# Patient Record
Sex: FEMALE | Race: WHITE | NOT HISPANIC OR LATINO | Employment: FULL TIME | URBAN - NONMETROPOLITAN AREA
[De-identification: names, ages, dates, MRNs, and addresses within clinical notes are randomized per-mention and may not be internally consistent; named-entity substitution may affect disease eponyms.]

---

## 2018-08-15 ENCOUNTER — OFFICE VISIT CONVERTED (OUTPATIENT)
Dept: FAMILY MEDICINE CLINIC | Age: 58
End: 2018-08-15
Attending: NURSE PRACTITIONER

## 2018-12-07 ENCOUNTER — OFFICE VISIT CONVERTED (OUTPATIENT)
Dept: FAMILY MEDICINE CLINIC | Age: 58
End: 2018-12-07
Attending: NURSE PRACTITIONER

## 2020-01-27 ENCOUNTER — OFFICE VISIT CONVERTED (OUTPATIENT)
Dept: FAMILY MEDICINE CLINIC | Age: 60
End: 2020-01-27
Attending: NURSE PRACTITIONER

## 2020-02-03 ENCOUNTER — OFFICE VISIT CONVERTED (OUTPATIENT)
Dept: FAMILY MEDICINE CLINIC | Age: 60
End: 2020-02-03
Attending: NURSE PRACTITIONER

## 2020-02-13 ENCOUNTER — HOSPITAL ENCOUNTER (OUTPATIENT)
Dept: OTHER | Facility: HOSPITAL | Age: 60
Discharge: HOME | End: 2020-02-13
Attending: NURSE PRACTITIONER

## 2020-02-13 LAB
ALBUMIN SERPL-MCNC: 3.9 G/DL (ref 3.5–5)
ALBUMIN/GLOB SERPL: 1.6 {RATIO} (ref 1.4–2.6)
ALP SERPL-CCNC: 129 U/L (ref 53–141)
ALT SERPL-CCNC: 14 U/L (ref 10–40)
ANION GAP SERPL CALC-SCNC: 16 MMOL/L (ref 8–19)
AST SERPL-CCNC: 12 U/L (ref 15–50)
BILIRUB SERPL-MCNC: 0.58 MG/DL (ref 0.2–1.3)
BUN SERPL-MCNC: 14 MG/DL (ref 5–25)
BUN/CREAT SERPL: 25 {RATIO} (ref 6–20)
CALCIUM SERPL-MCNC: 9 MG/DL (ref 8.7–10.4)
CHLORIDE SERPL-SCNC: 103 MMOL/L (ref 99–111)
CHOLEST SERPL-MCNC: 171 MG/DL (ref 107–200)
CHOLEST/HDLC SERPL: 3.2 {RATIO} (ref 3–6)
CONV CO2: 24 MMOL/L (ref 22–32)
CONV TOTAL PROTEIN: 6.3 G/DL (ref 6.3–8.2)
CREAT UR-MCNC: 0.57 MG/DL (ref 0.5–0.9)
ERYTHROCYTE [DISTWIDTH] IN BLOOD BY AUTOMATED COUNT: 13.4 % (ref 11.5–14.5)
GFR SERPLBLD BASED ON 1.73 SQ M-ARVRAT: >60 ML/MIN/{1.73_M2}
GLOBULIN UR ELPH-MCNC: 2.4 G/DL (ref 2–3.5)
GLUCOSE SERPL-MCNC: 94 MG/DL (ref 65–99)
HBA1C MFR BLD: 13.9 G/DL (ref 12–16)
HCT VFR BLD AUTO: 43.1 % (ref 37–47)
HDLC SERPL-MCNC: 53 MG/DL (ref 40–60)
LDLC SERPL CALC-MCNC: 107 MG/DL (ref 70–100)
MCH RBC QN AUTO: 29.4 PG (ref 27–31)
MCHC RBC AUTO-ENTMCNC: 32.3 G/DL (ref 33–37)
MCV RBC AUTO: 91.3 FL (ref 81–99)
OSMOLALITY SERPL CALC.SUM OF ELEC: 288 MOSM/KG (ref 273–304)
PLATELET # BLD AUTO: 196 10*3/UL (ref 130–400)
PMV BLD AUTO: 10.2 FL (ref 7.4–10.4)
POTASSIUM SERPL-SCNC: 4 MMOL/L (ref 3.5–5.3)
RBC # BLD AUTO: 4.72 10*6/UL (ref 4.2–5.4)
SODIUM SERPL-SCNC: 139 MMOL/L (ref 135–147)
TRIGL SERPL-MCNC: 53 MG/DL (ref 40–150)
VLDLC SERPL-MCNC: 11 MG/DL (ref 5–37)
WBC # BLD AUTO: 7.45 10*3/UL (ref 4.8–10.8)

## 2021-04-12 ENCOUNTER — HOSPITAL ENCOUNTER (OUTPATIENT)
Dept: OTHER | Facility: HOSPITAL | Age: 61
Discharge: HOME | End: 2021-04-12
Attending: NURSE PRACTITIONER

## 2021-04-12 ENCOUNTER — OFFICE VISIT CONVERTED (OUTPATIENT)
Dept: FAMILY MEDICINE CLINIC | Age: 61
End: 2021-04-12
Attending: NURSE PRACTITIONER

## 2021-04-12 LAB
ALBUMIN SERPL-MCNC: 3.9 G/DL (ref 3.5–5)
ALBUMIN/GLOB SERPL: 1.6 {RATIO} (ref 1.4–2.6)
ALP SERPL-CCNC: 125 U/L (ref 53–141)
ALT SERPL-CCNC: 10 U/L (ref 10–40)
ANION GAP SERPL CALC-SCNC: 16 MMOL/L (ref 8–19)
AST SERPL-CCNC: 11 U/L (ref 15–50)
BILIRUB SERPL-MCNC: 0.43 MG/DL (ref 0.2–1.3)
BUN SERPL-MCNC: 11 MG/DL (ref 5–25)
BUN/CREAT SERPL: 16 {RATIO} (ref 6–20)
CALCIUM SERPL-MCNC: 9 MG/DL (ref 8.7–10.4)
CHLORIDE SERPL-SCNC: 105 MMOL/L (ref 99–111)
CHOLEST SERPL-MCNC: 172 MG/DL (ref 107–200)
CHOLEST/HDLC SERPL: 3.5 {RATIO} (ref 3–6)
CONV CO2: 24 MMOL/L (ref 22–32)
CONV TOTAL PROTEIN: 6.3 G/DL (ref 6.3–8.2)
CREAT UR-MCNC: 0.69 MG/DL (ref 0.5–0.9)
GFR SERPLBLD BASED ON 1.73 SQ M-ARVRAT: >60 ML/MIN/{1.73_M2}
GLOBULIN UR ELPH-MCNC: 2.4 G/DL (ref 2–3.5)
GLUCOSE SERPL-MCNC: 101 MG/DL (ref 65–99)
HDLC SERPL-MCNC: 49 MG/DL (ref 40–60)
LDLC SERPL CALC-MCNC: 107 MG/DL (ref 70–100)
OSMOLALITY SERPL CALC.SUM OF ELEC: 292 MOSM/KG (ref 273–304)
POTASSIUM SERPL-SCNC: 4.1 MMOL/L (ref 3.5–5.3)
SODIUM SERPL-SCNC: 141 MMOL/L (ref 135–147)
TRIGL SERPL-MCNC: 79 MG/DL (ref 40–150)
VLDLC SERPL-MCNC: 16 MG/DL (ref 5–37)

## 2021-05-18 NOTE — PROGRESS NOTES
Daily Cartagena  1960     Office/Outpatient Visit    Visit Date: Mon, Apr 12, 2021 08:40 am    Provider: Clarissa Vasques N.P. (Assistant: Sonam Elena LPN)    Location: [facility]        Electronically signed by Clarissa Vasques N.P. on  04/12/2021 10:27:04 AM                             Subjective:        CC: Ms. Cartagena is a 60 year old White female.  This is a follow-up visit.  med refills, also hasn't been feeling well, been having issues with Mucus, had a sinus headache on left side.;         HPI:           Ms. Cartagena desires help with smoking cessation.  She reports smoking one-half pack per day.  She has smoked for.  She smokes the most at home and in the car.  no interest in quitting           Heartburn details; the discomfort is fairly diffuse and does not localize.  heartburn  on Omeprazole  Takes Daily Never had a scope in the past           Additionally, she presents with history of essential (primary) hypertension.  compliance with treatment has been good.  She is tolerating the medication well without side effects.  She did not bring her blood pressure diary, but says that pressures have been okay.  Taking Metoprolol, losartan and amlodipine          In regard to the anxiety disorder, unspecified, her anxiety disorder was originally diagnosed many years ago.  taking citalopram           Concerning dorsalgia, unspecified, reason for visit: Pain.  The discomfort is most prominent in the lumbar spine.  This is a chronic problem, with essentially constant pain.  Other details: off an on flares  takes OTC  no problems today.            Additionally, she presents with history of hyperlipidemia, unspecified.  current treatment includes Lipitor.  Compliance with treatment has been good.  She specifically denies associated symptoms, including muscle pain and weakness.            With regard to the insomnia, unspecified, remedies that she has already tried include OTC sleep aids and  benadryl.      ROS:     CONSTITUTIONAL:  Negative for chills, fatigue and fever.      E/N/T:  Positive for sore throat ( acute; from drainage ).      CARDIOVASCULAR:  Negative for chest pain and pedal edema.      RESPIRATORY:  Positive for recent cough and drainage in throat.   Negative for dyspnea.      MUSCULOSKELETAL:  Positive for joint stiffness (right ankle with intermittent swelling - but goes down the next day).   Negative for arthralgias or myalgias.      NEUROLOGICAL:  Negative for dizziness, fainting, headaches and paresthesias.      ENDOCRINE:  Negative for hair loss, polydipsia and polyphagia.      ALLERGIC/IMMUNOLOGIC:  Positive for seasonal allergies.      PSYCHIATRIC:  Negative for anxiety, depression and suicidal thoughts.          Past Medical History / Family History / Social History:         Last Reviewed on 2021 09:00 AM by Clarissa Vasques    Past Medical History:             GYNECOLOGICAL HISTORY:             PREVENTIVE HEALTH MAINTENANCE             COLORECTAL CANCER SCREENING: declines colorectal cancer screening, understands reason for testing     MAMMOGRAM: was last done past due declines, understands reason for testing has not been done since early 40s     PAP SMEAR: was last done unsure -years ago         Surgical History:         Laminectomy: lumbar region; Procedures: EGD ;; Treadmill stress test --NEG;;         Family History:         Positive for Hypertension.      Positive for Cancer (type not specified).      Positive for Type 2 Diabetes.          Social History:     Occupation: [employer]     Marital Status:      Children: 2 children         Tobacco/Alcohol/Supplements:     Last Reviewed on 2021 08:45 AM by Sonam Elena    Tobacco: Current Smoker: She currently smokes every day, 1-5 cigarettes per day; 1/2 pack per day.  Non-drinker     Caffeine:  She admits to consuming caffeine via coffee ( -decaf ).          Substance Abuse History:      Last Reviewed on 10/18/2016 01:35 PM by Clarissa Vasques    None         Mental Health History:     Last Reviewed on 10/18/2016 01:35 PM by Clarissa Vasques        Communicable Diseases (eg STDs):     Last Reviewed on 10/18/2016 01:35 PM by Clarissa Vasques    Reportable health conditions; NEGATIVE         Current Problems:     Last Reviewed on 4/12/2021 09:00 AM by Clarissa Vasques    Nicotine dependence, unspecified, uncomplicated    Heartburn    Essential (primary) hypertension    Anxiety disorder, unspecified    Dorsalgia, unspecified    Hyperlipidemia, unspecified    Encounter for screening for depression    Insomnia, unspecified    Acute upper respiratory infection, unspecified        Immunizations:     None        Allergies:     Last Reviewed on 4/12/2021 08:45 AM by Sonam Elena    Lisinopril: cough  (Adverse Reaction)        Current Medications:     Last Reviewed on 4/12/2021 08:45 AM by Sonam Elena    omeprazole 20 mg oral capsule,delayed release (enteric coated) [1 capsule daily]    aspirin 325 mg oral tablet [1 tab daily]    metoprolol tartrate 25 mg oral tablet [Take 1 tablet by mouth twice daily]    citalopram 40 mg oral tablet [Take 1 tablet by mouth once daily]    atorvastatin 40 mg oral tablet [TAKE 1 TABLET BY MOUTH AT BEDTIME]    losartan 50 mg oral tablet [Take 1 tablet by mouth twice daily]    amLODIPine 2.5 mg oral tablet [Take 1 tablet by mouth once daily]        Objective:        Vitals:         Current: 4/12/2021 8:47:19 AM    Ht:  5 ft, 2.5 in;  Wt: 202.6 lbs;  BMI: 36.5T: 97.9 F (temporal);  BP: 148/106 mm Hg (left arm, sitting);  P: 88 bpm (left arm (BP Cuff), sitting);  sCr: 0.57 mg/dL;  GFR: 116.45        Repeat:     8:48:55 AM  BP:   131/96mm Hg (left arm, sitting) 8:49:9 AM  P:   91bpm (left arm (BP Cuff), sitting)     Exams:     PHYSICAL EXAM:     GENERAL: vital signs recorded - well developed, well nourished, obese;  no apparent distress;     E/N/T: EARS: external auditory  canal normal and erythematous bilaterally;  bilateral TMs are normal;  OROPHARYNX: posterior pharynx shows PND;     RESPIRATORY: normal appearance and symmetric expansion of chest wall; normal respiratory rate and pattern with no distress; decreased breath sounds throughout; no adventitous sounds noted;     CARDIOVASCULAR: normal rate; rhythm is regular;  no edema;     LYMPHATIC: no enlargement of cervical or facial nodes; no supraclavicular nodes;     MUSCULOSKELETAL: normal gait; normal range of motion of all major muscle groups; no limb or joint pain with range of motion;     NEUROLOGIC: mental status: alert and oriented x 3;     PSYCHIATRIC: appropriate affect and demeanor; normal speech pattern; normal thought and perception;         Assessment:         F17.200   Nicotine dependence, unspecified, uncomplicated       R12   Heartburn       I10   Essential (primary) hypertension       F41.9   Anxiety disorder, unspecified       M54.9   Dorsalgia, unspecified       E78.5   Hyperlipidemia, unspecified       J06.9   Acute upper respiratory infection, unspecified       G47.00   Insomnia, unspecified       R60.0   Localized edema           ORDERS:         Meds Prescribed:       [Refilled] omeprazole 20 mg oral capsule,delayed release (enteric coated) [1 capsule daily], #90 (ninety) capsules, Refills: 1 (one)       [Refilled] losartan 50 mg oral tablet [Take 1 tablet by mouth twice daily], #180 (one hundred and eighty) tablets, Refills: 1 (one)       [Refilled] atorvastatin 40 mg oral tablet [TAKE 1 TABLET BY MOUTH AT BEDTIME], #90 (ninety) tablets, Refills: 1 (one)       [Refilled] metoprolol tartrate 25 mg oral tablet [Take 1 tablet by mouth twice daily], #180 (one hundred and eighty) tablets, Refills: 1 (one)       [Refilled] citalopram 40 mg oral tablet [Take 1 tablet by mouth once daily], #90 (ninety) tablets, Refills: 1 (one)       [Refilled] amLODIPine 2.5 mg oral tablet [Take 1 tablet by mouth once daily], #90  (ninety) tablets, Refills: 1 (one)       [New Rx] traZODone 50 mg oral tablet [take 1/2 -  1 tablet (50 mg) by oral route HS PRN Insomnia], #30 (thirty) tablets, Refills: 1 (one)         Lab Orders:       97050  [facility] Comp. Metabolic Panel  (Send-Out)            27891  [facility] Lipid Panel  (Send-Out)              Other Orders:       4004F  Pt scrnd tobacco use rcvd tobacco cessation talk  (In-House)                      Plan:         Nicotine dependence, unspecified, uncomplicatedcontinue to decrease amount during the day and set goal for cessation         RECOMMENDATIONS given include: Counseled on smoking cessation and advised of the benefits to patient's health if she were to stop smoking. Counseling for less than 3 minutes.            Orders:       4004F  Pt scrnd tobacco use rcvd tobacco cessation talk  (In-House)              HeartburnLifestyle risk factors  obesity, smoking, anxiety            Prescriptions:       [Refilled] omeprazole 20 mg oral capsule,delayed release (enteric coated) [1 capsule daily], #90 (ninety) capsules, Refills: 1 (one)         Essential (primary) hypertensioncontinue to monitor at home,  if elevated, RTO sooner, otherwise, weight loss, smoking cessation, decrease salt intake    LABORATORY:  Labs ordered to be performed today include Comprehensive metabolic panel and lipid panel.            Prescriptions:       [Refilled] losartan 50 mg oral tablet [Take 1 tablet by mouth twice daily], #180 (one hundred and eighty) tablets, Refills: 1 (one)       [Refilled] amLODIPine 2.5 mg oral tablet [Take 1 tablet by mouth once daily], #90 (ninety) tablets, Refills: 1 (one)       [Refilled] metoprolol tartrate 25 mg oral tablet [Take 1 tablet by mouth twice daily], #180 (one hundred and eighty) tablets, Refills: 1 (one)           Orders:       [id number]  [facility] - [facility] Comp. Metabolic Panel  (Send-Out)            [facility]  [facility]              Anxiety disorder,  unspecified          Prescriptions:       [Refilled] citalopram 40 mg oral tablet [Take 1 tablet by mouth once daily], #90 (ninety) tablets, Refills: 1 (one)         Dorsalgia, unspecifiedfollow up PRN         Hyperlipidemia, unspecified          Prescriptions:       [Refilled] atorvastatin 40 mg oral tablet [TAKE 1 TABLET BY MOUTH AT BEDTIME], #90 (ninety) tablets, Refills: 1 (one)         Acute upper respiratory infection, unspecifiedsounds like allergy related,  Recommended OTC claratin, zyrtec and flonase.  Smoking cessation and RTO if new or worsening symptoms            Insomnia, unspecifiedwill try trazodone.  Discussed next day drowsiness possible. Take on a day off   RTO if no improvement or if working, she will call for refills and ok to fill until October appt          Prescriptions:       [New Rx] traZODone 50 mg oral tablet [take 1/2 -  1 tablet (50 mg) by oral route HS PRN Insomnia], #30 (thirty) tablets, Refills: 1 (one)             Patient Recommendations:        For  Nicotine dependence, unspecified, uncomplicated:        Stop smoking.              Charge Capture:         Primary Diagnosis:     F17.200  Nicotine dependence, unspecified, uncomplicated           Orders:      70987  Office/outpatient visit; established patient, level 4  (In-House)            4004F  Pt scrnd tobacco use rcvd tobacco cessation talk  (In-House)              R12  Heartburn     I10  Essential (primary) hypertension     F41.9  Anxiety disorder, unspecified     M54.9  Dorsalgia, unspecified     E78.5  Hyperlipidemia, unspecified     J06.9  Acute upper respiratory infection, unspecified     G47.00  Insomnia, unspecified     R60.0  Localized edema

## 2021-05-18 NOTE — PROGRESS NOTES
Daily Cartagena  1960     Office/Outpatient Visit    Visit Date:  03:38 pm    Provider: Yahaira Sanchez N.P. (Assistant: Raya Johnson MA)    Location: [facility]        Electronically signed by Yahaira Sanchez N.P. on  2020 07:01:51 PM                             Subjective:        CC: Ms. Cartagena is a 59 year old White female.  elevated bp;         HPI: Daily is here with concerns about her blood pressure. She reports that she saw her dentist Dr Miguel and is supposed to be having some oral procedures and surgeries performed. Was told that her blood pressure was high and that it needs to be better controlled prior to any of these procedures. Reports readings 143/99, 141/94, 138/90. She reports that she works from 315 pm to 7 am. Takes her medication at different times daily. She does not sleep well.    ROS:     CONSTITUTIONAL:  Positive for fatigue.   Negative for fever.      E/N/T:  Positive for tooth pain.   Negative for diminished hearing.      CARDIOVASCULAR:  Negative for chest pain and palpitations.      RESPIRATORY:  Negative for dyspnea and frequent wheezing.      NEUROLOGICAL:  Negative for dizziness and headaches.      PSYCHIATRIC:  Negative for depression and suicidal thoughts.          Past Medical History / Family History / Social History:         Last Reviewed on 2020 04:18 PM by Yahaira Sanchez    Past Medical History:             GYNECOLOGICAL HISTORY:             PREVENTIVE HEALTH MAINTENANCE             COLORECTAL CANCER SCREENING: declines colorectal cancer screening, understands reason for testing     MAMMOGRAM: was last done past due declines, understands reason for testing has not been done since early 40s     PAP SMEAR: was last done unsure -years ago         Surgical History:         Laminectomy: lumbar region; Procedures: EGD ;; Treadmill stress test --NEG;;         Family History:         Positive for Hypertension.      Positive  for Cancer (type not specified).      Positive for Type 2 Diabetes.          Social History:     Occupation: [employer]     Marital Status:      Children: 2 children         Tobacco/Alcohol/Supplements:     Last Reviewed on 1/27/2020 03:42 PM by Raya Johnson    Tobacco: Current Smoker: She currently smokes every day, 1/2 pack per day.  Non-drinker     Caffeine:  She admits to consuming caffeine via coffee ( -decaf ).          Substance Abuse History:     Last Reviewed on 10/18/2016 01:35 PM by Clarissa Vasques    None         Mental Health History:     Last Reviewed on 10/18/2016 01:35 PM by Clarissa Vasques        Communicable Diseases (eg STDs):     Last Reviewed on 10/18/2016 01:35 PM by Clarissa Vasques    Reportable health conditions; NEGATIVE         Current Problems:     Last Reviewed on 8/15/2018 03:36 PM by Clarissa Vasques    Nicotine dependence, unspecified, uncomplicated    Cigarette smoking    GERD    Heartburn    Hiatal hernia    Essential (primary) hypertension    Palpitations (mild, neg cardiac w/u in 2014)    HTN    Mitral valve regurgitation    Anxiety    Anxiety disorder, unspecified    Psoriasis    Back pain    Dorsalgia, unspecified    Hyperlipidemia    Other hyperlipidemia    Encounter for screening for depression        Immunizations:     None        Allergies:     Last Reviewed on 1/27/2020 03:42 PM by Raya Johnson    No Known Allergies.    Lisinopril: cough  (Adverse Reaction)        Current Medications:     Last Reviewed on 1/27/2020 03:42 PM by Raya Johnson    Omeprazole 20mg Capsules, Extended Release [1 capsule daily]    Aspirin (ASA) 325mg Tablet [1 tab daily]    Metoprolol Tartrate 25mg Tablet [Take 1 tablet(s) by mouth twice daily]    Losartan 25mg Tablet [Take 1 tablet(s) by mouth daily]    ATORVASTATIN 40MG   TAB [TAKE 1 TABLET BY MOUTH AT BEDTIME]    CITALOPRAM 40MG     TAB [TAKE 1 TABLET BY MOUTH ONCE DAILY]        Objective:         Vitals:         Historical:     12/7/2018  BP:   158/91 mm Hg ( (left arm, , sitting, );) 8/15/2018  BP:   138/84 mm Hg ( (right arm, , sitting, );) 9/22/2017  BP:   132/84 mm Hg ( (left arm, , sitting, );)     Current: 1/27/2020 3:44:16 PM    Ht:  5 ft, 2.5 in;  Wt: 193.2 lbs;  BMI: 34.8T: 98.7 F (oral);  BP: 131/99 mm Hg (left arm, sitting);  P: 91 bpm (left arm (BP Cuff), sitting);  sCr: 0.85 mg/dL;  GFR: 77.46        Exams:     PHYSICAL EXAM:     GENERAL: well developed, well nourished;  no apparent distress;     RESPIRATORY: normal respiratory rate and pattern with no distress; normal breath sounds with no rales, rhonchi, wheezes or rubs;     CARDIOVASCULAR: normal rate; rhythm is regular;     MUSCULOSKELETAL: normal gait;     NEUROLOGIC: mental status: alert and oriented x 3; GROSSLY INTACT     PSYCHIATRIC: appropriate affect and demeanor;         Assessment:         I10   Essential (primary) hypertension       Z13.31   Encounter for screening for depression       F17.200   Nicotine dependence, unspecified, uncomplicated           ORDERS:         Meds Prescribed:       [New Rx] losartan 50 mg oral tablet [take 1 tablet (50 mg) by oral route once daily], #30 (thirty) tablets, Refills: 0 (zero)         Other Orders:         Depression screen positive and follow up plan documented  (In-House)            4004F  Pt scrnd tobacco use rcvd tobacco cessation talk  (In-House)                      Plan:         Essential (primary) hypertensionWill continue Metoprolol at current dose. Advised to take regularly. Will increase Losartan to 50 mg daily. To return next Monday for free BP check. She is due for follow up with PCP. Reports that she will call to schedule.        RECOMMENDATIONS given include: keep blood pressure log as directed and eat a low salt diet.            Prescriptions:       [New Rx] losartan 50 mg oral tablet [take 1 tablet (50 mg) by oral route once daily], #30 (thirty) tablets, Refills: 0 (zero)          Encounter for screening for depression    MIPS PHQ-9 Depression Screening: Completed form scanned and in chart; Total Score 8 Positive Depression Screen: Suicide Risk Assessment completed--denies suicidal/homicidal ideation; Stable on medications. No suicidal ideation.            Orders:         Depression screen positive and follow up plan documented  (In-House)              Nicotine dependence, unspecified, uncomplicated        RECOMMENDATIONS given include: Counseled on smoking cessation and advised of the benefits to patient's health if she were to stop smoking. Counseling for less than 3 minutes.            Orders:       4004F  Pt scrnd tobacco use rcvd tobacco cessation talk  (In-House)                  Patient Recommendations:        For  Essential (primary) hypertension:    Keep a daily blood pressure log and report elevated blood pressure to provider as directed.          For  Nicotine dependence, unspecified, uncomplicated:        Stop smoking.              Charge Capture:         Primary Diagnosis:     I10  Essential (primary) hypertension           Orders:      38289  Office/outpatient visit; established patient, level 3  (In-House)              Z13.31  Encounter for screening for depression           Orders:        Depression screen positive and follow up plan documented  (In-House)              F17.200  Nicotine dependence, unspecified, uncomplicated           Orders:      4004F  Pt scrnd tobacco use rcvd tobacco cessation talk  (In-House)

## 2021-05-18 NOTE — PROGRESS NOTES
Daily Cartagena 1960     Office/Outpatient Visit    Visit Date: Fri, Dec 7, 2018 10:31 am    Provider: Clarissa Vasques N.P. (Assistant: Gregorio Silver)    Location: [facility]        Electronically signed by Clarissa Vasques N.P. on  2018 04:53:49 PM                             SUBJECTIVE:        CC:     Ms. Cartagena is a 58 year old White female.  This is a follow-up visit.  (not taking trazodone) needs refills on bp meds;         HPI:         Patient to be evaluated for hTN.  She did not bring her blood pressure diary, but says that pressures have been okay.  She is tolerating the medication well without side effects.  Compliance with treatment has been good.  has not taken her BP medication today.  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  currently taking citalopram for her anxiety doing ok  no new problems noted - other than her continuing problem with sleep - which she does work shift work and knows that this is much of the reason     ROS:     CONSTITUTIONAL:  Negative for chills, fatigue, fever, and weight change.      CARDIOVASCULAR:  Negative for chest pain, orthopnea, paroxysmal nocturnal dyspnea and pedal edema.      RESPIRATORY:  Positive for recent cough ( typically dry; with the lisinopril ).   Negative for dyspnea.      GASTROINTESTINAL:  Negative for abdominal pain, constipation, diarrhea, nausea and vomiting.      NEUROLOGICAL:  Negative for dizziness, headaches, paresthesias, and weakness.      PSYCHIATRIC:  Positive for anxiety ( (controlled on medication) ), feelings of stress and insomnia.   Negative for crying spells, depression, anhedonia, mood swings, personality change, difficulty concentrating or recreational drug use.          PMH/FMH/SH:     Last Reviewed on 2017 10:10 AM by Clarissa Vasques    Past Medical History:             GYNECOLOGICAL HISTORY:              PREVENTIVE HEALTH MAINTENANCE             COLONOSCOPY: has never been done     MAMMOGRAM: was last done past due     PAP SMEAR: was last done unsure -years ago         Surgical History:         Laminectomy: lumbar region; Procedures: EGD 1999;; Treadmill stress test 2014--NEG;;         Family History:         Positive for Hypertension.      Positive for Cancer (type not specified).      Positive for Type 2 Diabetes.          Social History:     Occupation: [employer]     Marital Status:      Children: 2 children         Tobacco/Alcohol/Supplements:     Last Reviewed on 12/07/2018 10:33 AM by Gregorio Silver    Tobacco: Current Smoker: She currently smokes every day, 1/2 pack per day.  Non-drinker     Caffeine:  She admits to consuming caffeine via coffee ( -decaf ).          Substance Abuse History:     Last Reviewed on 10/18/2016 01:35 PM by Clarissa Vasques    None         Mental Health History:     Last Reviewed on 10/18/2016 01:35 PM by Clarissa Vasques        Communicable Diseases (eg STDs):     Last Reviewed on 10/18/2016 01:35 PM by Clarissa Vasques    Reportable health conditions; NEGATIVE             Current Problems:     Last Reviewed on 8/15/2018 03:36 PM by Clarissa Vasques    Hyperlipidemia     Back pain     Psoriasis     Anxiety     Mitral valve regurgitation     Palpitations (mild, neg cardiac w/u in 2014)     HTN     Hiatal hernia     GERD     Cigarette smoking     Insomnia         Immunizations:     None        Allergies:     Last Reviewed on 12/07/2018 10:33 AM by Gregorio Silver      No Known Drug Allergies.     Lisinopril: cough (Adverse Reaction)        Current Medications:     Last Reviewed on 12/07/2018 10:34 AM by Gregorio Silver    Metoprolol Tartrate 25mg Tablet Take 1 tablet(s) by mouth twice daily     Aspirin (ASA) 325mg Tablet 1 tab daily     Omeprazole 20mg Capsules, Extended Release 1 capsule daily     Atorvastatin Calcium 40mg Tablet 1 tab hs     Citalopram  Hydrobromide 40mg Tablet 1 tab daily         OBJECTIVE:        Vitals:         Current: 12/7/2018 10:35:26 AM    Ht:  5 ft, 2.5 in;  Wt: 194.4 lbs;  BMI: 35.0    T: 98.9 F (oral);  BP: 158/91 mm Hg (left arm, sitting);  P: 57 bpm (left arm (BP Cuff), sitting);  sCr: 0.85 mg/dL;  GFR: 78.59        Exams:     PHYSICAL EXAM:     GENERAL: vital signs recorded - well developed, well nourished, obese;  no apparent distress;     NECK: range of motion is normal; thyroid is non-palpable;     RESPIRATORY: normal respiratory rate and pattern with no distress; normal breath sounds with no rales, rhonchi, wheezes or rubs;     CARDIOVASCULAR: normal rate; rhythm is regular;  no systolic murmur; no edema;     LYMPHATIC: no enlargement of cervical or facial nodes; no supraclavicular nodes;     NEUROLOGICAL:  cranial nerves, motor and sensory function, reflexes, gait and coordination are all intact;     PSYCHIATRIC:  appropriate affect and demeanor; normal speech pattern; grossly normal memory;         ASSESSMENT:           401.1   I10  HTN              DDx:     300.02   F41.9  Anxiety              DDx:     780.52   G47.00  Insomnia              DDx:     272.4   E78.4  Hyperlipidemia              DDx:         ORDERS:         Meds Prescribed:       Hydroxyzine HCl 25mg Tablet 1-2 tablets qhs  #45 (Forty Five) tablet(s) Refills: 1       Refill of: Metoprolol Tartrate 25mg Tablet Take 1 tablet(s) by mouth twice daily  #180 (One [place] and Eighty) tablet(s) Refills: 3       Losartan 25mg Tablet Take 1 tablet(s) by mouth daily  #90 (Ninety) tablet(s) Refills: 3       Refill of: Citalopram Hydrobromide 40mg Tablet 1 tab daily  #90 (Ninety) tablet(s) Refills: 3       Refill of: Atorvastatin Calcium 40mg Tablet 1 tab hs  #90 (Ninety) tablet(s) Refills: 3                 PLAN:          HTN           Prescriptions:       Refill of: Metoprolol Tartrate 25mg Tablet Take 1 tablet(s) by mouth twice daily  #180 (One [place] and Eighty) tablet(s)  Refills: 3       Losartan 25mg Tablet Take 1 tablet(s) by mouth daily  #90 (Ninety) tablet(s) Refills: 3           Patient Education Handouts:       DASH Diet           Anxiety           Prescriptions:       Refill of: Citalopram Hydrobromide 40mg Tablet 1 tab daily  #90 (Ninety) tablet(s) Refills: 3          Insomnia           Prescriptions:       Hydroxyzine HCl 25mg Tablet 1-2 tablets qhs  #45 (Forty Five) tablet(s) Refills: 1          Hyperlipidemia           Prescriptions:       Refill of: Atorvastatin Calcium 40mg Tablet 1 tab hs  #90 (Ninety) tablet(s) Refills: 3             Other Orders:       40069  Office/outpatient visit; established patient, level 4  (In-House)           CHARGE CAPTURE:           Primary Diagnosis:     401.1 HTN            I10    Essential (primary) hypertension    300.02 Anxiety            F41.9    Anxiety disorder, unspecified    780.52 Insomnia            G47.00    Insomnia, unspecified    272.4 Hyperlipidemia            E78.4    Other hyperlipidemia        Other Orders:           53757   Office/outpatient visit; established patient, level 4  (In-House)

## 2021-05-18 NOTE — PROGRESS NOTES
Daily Cartagena 1960     Office/Outpatient Visit    Visit Date: Wed, Aug 15, 2018 03:12 pm    Provider: Clarissa Vasques N.P. (Assistant: Sarah Spurling, MA)    Location: [facility]        Electronically signed by Clarissa Vasques N.P. on  08/17/2018 08:44:37 AM                             SUBJECTIVE:        CC:     Ms. Cartagena is a 57 year old White female.  This is a follow-up visit.  med refill and pulled muscle;         HPI:         Ms. Cartagena presents with sprains and strains of other and unspecified parts of back, unspecified.  The location is primarily in the lower, left lumbar spine.  It does not radiate.  She characterizes it as moderate in intensity.  This is an acute episode with no prior history of back pain.  The event which precipitated this pain was hanging drywall yesterday.  This occurred at home.      Regarding GERD,  taking Omeprazole daily  - working well to control symptoms.  Takes OTC - no concerns at this time         Dx with hTN; this was first diagnosed several years ago.  Her current cardiac medication regimen includes a beta-blocker ( metoprolol ).  Ms. Cartagena does not check her blood pressure other than at her clinic appointments.  She is tolerating the medication well without side effects.  Compliance with treatment has been good.          With regard to the anxiety, her anxiety disorder was originally diagnosed many years ago.  Her symptom complex includes insomnia, palpitations, and tachycardia.  True panic attacks apparently do not occur.  The frequency symptoms is several times per day.  There are no apparent triggers that increase the anxiety.  Current treatment includes Celexa.  feels as if her medication is not as effective as in the past - still having some racing thoughts     ROS:     CONSTITUTIONAL:  Negative for chills, fatigue and fever.      CARDIOVASCULAR:  Negative for chest pain and pedal edema.      RESPIRATORY:  Negative for recent cough and dyspnea.       GASTROINTESTINAL:  Positive for acid reflux symptoms ( controlled on medication ).   Negative for abdominal pain, constipation, diarrhea, heartburn, nausea or vomiting.      MUSCULOSKELETAL:  Positive for back pain and myalgias.      NEUROLOGICAL:  Negative for paresthesias and weakness.      PSYCHIATRIC:  Positive for anxiety, feelings of stress, difficulty concentrating and insomnia.   Negative for crying spells, depression, sadness or suicidal thoughts.          PMH/FMH/SH:     Last Reviewed on 2017 10:10 AM by Clarissa Vasques    Past Medical History:             GYNECOLOGICAL HISTORY:             PREVENTIVE HEALTH MAINTENANCE             COLONOSCOPY: has never been done     MAMMOGRAM: was last done past due     PAP SMEAR: was last done unsure -years ago         Surgical History:         Laminectomy: lumbar region; Procedures: EGD ;; Treadmill stress test --NEG;;         Family History:         Positive for Hypertension.      Positive for Cancer (type not specified).      Positive for Type 2 Diabetes.          Social History:     Occupation: [employer]     Marital Status:      Children: 2 children         Tobacco/Alcohol/Supplements:     Last Reviewed on 8/15/2018 03:18 PM by Spurling, Sarah C    Tobacco: Current Smoker: She currently smokes every day, 1/2 pack per day.  Non-drinker     Caffeine:  She admits to consuming caffeine via coffee ( -decaf ).          Substance Abuse History:     Last Reviewed on 10/18/2016 01:35 PM by Clarissa Vasques    None         Mental Health History:     Last Reviewed on 10/18/2016 01:35 PM by Clarissa Vasques        Communicable Diseases (eg STDs):     Last Reviewed on 10/18/2016 01:35 PM by Clarissa Vasques    Reportable health conditions; NEGATIVE             Current Problems:     Last Reviewed on 8/15/2018 03:36 PM by Clarissa Vasques    Back pain     Psoriasis     Anxiety     Mitral valve regurgitation     Palpitations (mild, neg  cardiac w/u in 2014)     HTN     Hiatal hernia     GERD     Cigarette smoking     Insomnia     Sprains and strains of other and unspecified parts of back, unspecified         Immunizations:     None        Allergies:     Last Reviewed on 8/15/2018 03:17 PM by Spurling, Sarah C      No Known Drug Allergies.         Current Medications:     Last Reviewed on 8/15/2018 03:18 PM by Spurling, Sarah C    Citalopram Hydrobromide 20mg Tablet Take 1 tablet(s) by mouth daily     Metoprolol Tartrate 25mg Tablet Take 1 tablet(s) by mouth twice daily     Aspirin (ASA) 325mg Tablet 1 tab daily     Omeprazole 20mg Capsules, Extended Release 1 capsule daily         OBJECTIVE:        Vitals:         Historical:     09/22/2017  Wt:   183.8lbs        Current: 8/15/2018 3:20:56 PM    Ht:  5 ft, 2.5 in;  Wt: 194.2 lbs;  BMI: 35.0    T: 98.2 F (oral);  BP: 146/91 mm Hg (right arm, sitting);  P: 65 bpm (right arm (BP Cuff), sitting);  sCr: 0.72 mg/dL;  GFR: 93.84        Repeat:     3:44:19 PM     BP:   138/84mm Hg (right arm, sitting)         Exams:     PHYSICAL EXAM:     GENERAL: vital signs recorded - well developed, well nourished;  no apparent distress;     EYES: extraocular movements intact; PERRL;     E/N/T: EARS: external auditory canal normal;  bilateral TMs are normal;  NOSE:  normal nasal mucosa, septum, turbinates, and sinuses; OROPHARYNX:  normal mucosa, dentition, gingiva, and posterior pharynx;     NECK: range of motion is normal;     RESPIRATORY: normal appearance and symmetric expansion of chest wall; normal respiratory rate and pattern with no distress; normal breath sounds with no rales, rhonchi, wheezes or rubs;     CARDIOVASCULAR: normal rate; rhythm is regular;  no edema;     GASTROINTESTINAL: nontender; normal bowel sounds; no organomegaly;     LYMPHATIC: no enlargement of cervical or facial nodes; no supraclavicular nodes;     MUSCULOSKELETAL: normal gait; normal range of motion of all major muscle groups; no limb or  joint pain with range of motion;     NEUROLOGIC: mental status: alert and oriented x 3;     PSYCHIATRIC: appropriate affect and demeanor; normal speech pattern; normal thought and perception;         ASSESSMENT           847.9   S23.9XXA  Sprains and strains of other and unspecified parts of back, unspecified              DDx:     530.81   R12  GERD              DDx:     401.1   I10  HTN              DDx:     300.02   F41.9  Anxiety              DDx:     307.41   G47.00  Insomnia              DDx:     305.1   F17.200  Cigarette smoking              DDx:     V05.3   Z23  Vaccination against hepatitis A              DDx:         ORDERS:         Meds Prescribed:       Methocarbamol 500mg Tablets 1/2 - 1 tab daily PRN  #20 (Twenty) tablet(s) Refills: 0       Refill of: Citalopram Hydrobromide 40mg Tablet 1 tab daily  #90 (Ninety) tablet(s) Refills: 3       Refill of: Metoprolol Tartrate 25mg Tablet Take 1 tablet(s) by mouth twice daily  #180 (One [place] and Eighty) tablet(s) Refills: 3       Trazodone HCl 50mg Tablet 1/2 - 1 @HS PRN for sleep  #30 (Thirty) tablet(s) Refills: 1         Lab Orders:       FUTURE  Future order to be done at patients convenience  (Send-Out)         96760  BMP [facility] Basic Metabolic Panel  (Send-Out)         89095  LPDP [facility] Lipid Panel  (Send-Out)         32620  TSH - [facility] TSH  (Send-Out)           Procedures Ordered:       63396  HepA vaccine adult dose for intramuscular use  (In-House)           Other Orders:       4004F  Pt scrnd tobacco use rcvd tobacco cessation talk  (In-House)                   PLAN:          Sprains and strains of other and unspecified parts of back, unspecified recommend heat application- biofreeze OTC may consider PT in future if no resolution - rest over the next 2-3 weeks           Prescriptions:       Methocarbamol 500mg Tablets 1/2 - 1 tab daily PRN  #20 (Twenty) tablet(s) Refills: 0          GERD continue current medication  consider try tapering in future to  avoid long term chronic use          HTN         FOLLOW-UP TESTING #1: FOLLOW-UP LABORATORY:  Labs to be scheduled in the future include BMP, lipid panel, and TSH.            Prescriptions:       Refill of: Metoprolol Tartrate 25mg Tablet Take 1 tablet(s) by mouth twice daily  #180 (One [place] and Eighty) tablet(s) Refills: 3           Orders:       FUTURE  Future order to be done at patients convenience  (Send-Out)         28008  BMP [facility] Basic Metabolic Panel  (Send-Out)         39086  LPDP [facility] Lipid Panel  (Send-Out)         17238  TSH [facility] TSH  (Send-Out)            Anxiety increased medication dose today           Prescriptions:       Refill of: Citalopram Hydrobromide 40mg Tablet 1 tab daily  #90 (Ninety) tablet(s) Refills: 3          Insomnia do not start trazodone until muscle relaxers complete           Prescriptions:       Trazodone HCl 50mg Tablet 1/2 - 1 @HS PRN for sleep  #30 (Thirty) tablet(s) Refills: 1          Cigarette smoking         RECOMMENDATIONS given include: Counseled on smoking cessation and advised of the benefits to patient's health if she were to stop smoking. Counseling for less than 3 minutes.            Orders:       4004F  Pt scrnd tobacco use rcvd tobacco cessation talk  (In-House)            Vaccination against hepatitis A         IMMUNIZATIONS given today: Hepatitis A.            Orders:       12929  HepA vaccine adult dose for intramuscular use  (In-House)               Patient Recommendations:        For  HTN:             The following laboratory testing has been ordered: metabolic panel, basic lipid panel TSH         For  Cigarette smoking:         Stop smoking.              CHARGE CAPTURE           **Please note: ICD descriptions below are intended for billing purposes only and may not represent clinical diagnoses**        Primary Diagnosis:         847.9 Sprains and strains of other and unspecified parts of back, unspecified            S23.9XXA    Sprain of unspecified  parts of thorax, initial encounter              Orders:          19367   Office/outpatient visit; established patient, level 4  (In-House)           530.81 GERD            R12    Heartburn    401.1 HTN            I10    Essential (primary) hypertension    300.02 Anxiety            F41.9    Anxiety disorder, unspecified    307.41 Insomnia            G47.00    Insomnia, unspecified    305.1 Cigarette smoking            F17.200    Nicotine dependence, unspecified, uncomplicated              Orders:          4004F   Pt scrnd tobacco use rcvd tobacco cessation talk  (In-House)           V05.3 Vaccination against hepatitis A            Z23    Encounter for immunization              Orders:          47018   HepA vaccine adult dose for intramuscular use  (In-House)               ADDENDUMS:      ____________________________________    Addendum: 08/21/2018 03:59 PM - Spurling, Sarah C        Patient was not given this vaccine, called and spoke with patient on 8-21-18 and she told me that she did not want the Hep A vaccine. Please remove order from note, thanks. \SCS

## 2021-05-18 NOTE — PROGRESS NOTES
Daily Cartagena  1960     Office/Outpatient Visit    Visit Date: Mon, Feb 3, 2020 10:45 am    Provider: Clarissa Vasques N.P. (Assistant: Leticia Nieves MA)    Location: [facility]        Electronically signed by Clarissa Vasques N.P. on  2020 11:24:47 PM                             Subjective:        CC: NOT TAKING LOSARTAN 25MGMs. Osiel is a 59 year old White female.  This is a follow-up visit.  check up;         HPI:           Ms. Cartagena presents with essential (primary) hypertension.  This was first diagnosed several years ago.  Current nonpharmacologic treatment includes low sodium diet and avoiding caffeine.  Her current cardiac medication regimen includes a beta-blocker and an angiotensin receptor blocker.  Compliance with treatment has been good.  She is tolerating the medication well without side effects.  Review of her blood pressure log reveals systolics in the 150s and diastolics in the low 100s.            Heartburn details; the discomfort is fairly diffuse and does not localize.  continues to take omeprazole - working well for her  would like refills           Concerning hyperlipidemia, unspecified, compliance with treatment has been good.  She specifically denies associated symptoms, including muscle pain and weakness.            Complaint of anxiety disorder, unspecified..  taking citralpram - doing well  need refill     ROS:     CONSTITUTIONAL:  Negative for chills, fatigue and fever.      CARDIOVASCULAR:  Negative for chest pain and pedal edema.      RESPIRATORY:  Negative for recent cough and dyspnea.      NEUROLOGICAL:  Positive for headaches ( occasional ).   Negative for dizziness, fainting or paresthesias.      PSYCHIATRIC:  Positive for feelings of stress.          Past Medical History / Family History / Social History:         Last Reviewed on 2020 11:48 AM by Clarissa Vasques    Past Medical History:             GYNECOLOGICAL HISTORY:              PREVENTIVE HEALTH MAINTENANCE             COLORECTAL CANCER SCREENING: declines colorectal cancer screening, understands reason for testing     MAMMOGRAM: was last done past due declines, understands reason for testing has not been done since early 40s     PAP SMEAR: was last done unsure -years ago         Surgical History:         Laminectomy: lumbar region; Procedures: EGD 1999;; Treadmill stress test 2014--NEG;;         Family History:         Positive for Hypertension.      Positive for Cancer (type not specified).      Positive for Type 2 Diabetes.          Social History:     Occupation: [employer]     Marital Status:      Children: 2 children         Tobacco/Alcohol/Supplements:     Last Reviewed on 1/27/2020 03:42 PM by Raya Johnson    Tobacco: Current Smoker: She currently smokes every day, 1/2 pack per day.  Non-drinker     Caffeine:  She admits to consuming caffeine via coffee ( -decaf ).          Substance Abuse History:     Last Reviewed on 10/18/2016 01:35 PM by Clarissa Vasques    None         Mental Health History:     Last Reviewed on 10/18/2016 01:35 PM by Clarissa Vasques        Communicable Diseases (eg STDs):     Last Reviewed on 10/18/2016 01:35 PM by Clarissa Vasques    Reportable health conditions; NEGATIVE         Current Problems:     Last Reviewed on 2/03/2020 11:48 AM by Clarissa Vasques    Nicotine dependence, unspecified, uncomplicated    Heartburn    Hiatal hernia    Essential (primary) hypertension    Palpitations (mild, neg cardiac w/u in 2014)    Mitral valve regurgitation    Anxiety disorder, unspecified    Psoriasis    Back pain    Dorsalgia, unspecified    Other hyperlipidemia    Hyperlipidemia, unspecified    Hyperlipidemia    Encounter for screening for depression        Immunizations:     None        Allergies:     Last Reviewed on 2/03/2020 10:53 AM by Leticia Nieves    Lisinopril: cough  (Adverse Reaction)        Current Medications:     Last  Reviewed on 1/27/2020 03:42 PM by Raya Johnson    Omeprazole 20 mg oral capsule,delayed release (enteric coated) [1 capsule daily]    aspirin 325 mg oral tablet [1 tab daily]    Metoprolol Tartrate 25mg Tablet [Take 1 tablet(s) by mouth twice daily]    ATORVASTATIN 40MG   TAB  [TAKE 1 TABLET BY MOUTH AT BEDTIME]    CITALOPRAM 40MG     TAB  [TAKE 1 TABLET BY MOUTH ONCE DAILY]    losartan 50 mg oral tablet [take 1 tablet (50 mg) by oral route once daily]    amLODIPine 2.5 mg oral tablet [take 1 tablet (2.5 mg) by oral route once daily]        Objective:        Vitals:         Current: 2/3/2020 10:52:27 AM    Ht:  5 ft, 2.5 in;  Wt: 195.4 lbs;  BMI: 35.2T: 98.5 F (oral);  BP: 162/104 mm Hg (left arm, sitting);  P: 79 bpm (left arm (BP Cuff), sitting);  sCr: 0.85 mg/dL;  GFR: 77.83        Repeat:     10:53:7 AM  BP:   145/100mm Hg (right arm, sitting, HR: 74)     Exams:     PHYSICAL EXAM:     GENERAL: vital signs recorded - well developed, well nourished;  no apparent distress;     NECK: range of motion is normal;     RESPIRATORY: normal appearance and symmetric expansion of chest wall; normal respiratory rate and pattern with no distress; normal breath sounds with no rales, rhonchi, wheezes or rubs;     CARDIOVASCULAR: normal rate; rhythm is regular;  no edema;     MUSCULOSKELETAL: normal gait; normal range of motion of all major muscle groups; no limb or joint pain with range of motion;     NEUROLOGIC: mental status: alert and oriented x 3;     PSYCHIATRIC: appropriate affect and demeanor; normal speech pattern; normal thought and perception;         Assessment:         I10   Essential (primary) hypertension       R12   Heartburn       E78.5   Hyperlipidemia, unspecified       F41.9   Anxiety disorder, unspecified           ORDERS:         Meds Prescribed:       [Refilled] losartan 50 mg oral tablet [take 1 tablet (50 mg) by oral route twice daily], #60 (sixty) tablets, Refills: 1 (one)       [New Rx]  amLODIPine 2.5 mg oral tablet [take 1 tablet (2.5 mg) by oral route once daily], #30 (thirty) tablets, Refills: 0 (zero)       [Refilled] omeprazole 20 mg oral capsule,delayed release (enteric coated) [1 capsule daily], #30 (thirty) capsules, Refills: 6 (six)       [Refilled] atorvastatin 40 mg oral tablet [TAKE 1 TABLET BY MOUTH AT BEDTIME], #30 (thirty) tablets, Refills: 3 (three)       [Refilled] citalopram 40 mg oral tablet [TAKE 1 TABLET BY MOUTH ONCE DAILY], #30 (thirty) tablets, Refills: 3 (three)                 Plan:         Essential (primary) hypertension          Prescriptions:       [Refilled] losartan 50 mg oral tablet [take 1 tablet (50 mg) by oral route twice daily], #60 (sixty) tablets, Refills: 1 (one)       [New Rx] amLODIPine 2.5 mg oral tablet [take 1 tablet (2.5 mg) by oral route once daily], #30 (thirty) tablets, Refills: 0 (zero)         Heartburn          Prescriptions:       [Refilled] omeprazole 20 mg oral capsule,delayed release (enteric coated) [1 capsule daily], #30 (thirty) capsules, Refills: 6 (six)         Hyperlipidemia, unspecified          Prescriptions:       [Refilled] atorvastatin 40 mg oral tablet [TAKE 1 TABLET BY MOUTH AT BEDTIME], #30 (thirty) tablets, Refills: 3 (three)         Anxiety disorder, unspecified          Prescriptions:       [Refilled] citalopram 40 mg oral tablet [TAKE 1 TABLET BY MOUTH ONCE DAILY], #30 (thirty) tablets, Refills: 3 (three)             Charge Capture:         Primary Diagnosis:     I10  Essential (primary) hypertension           Orders:      26940  Office/outpatient visit; established patient, level 4  (In-House)              R12  Heartburn     E78.5  Hyperlipidemia, unspecified     F41.9  Anxiety disorder, unspecified

## 2021-06-07 ENCOUNTER — OFFICE VISIT (OUTPATIENT)
Dept: FAMILY MEDICINE CLINIC | Age: 61
End: 2021-06-07

## 2021-06-07 VITALS
BODY MASS INDEX: 35.65 KG/M2 | DIASTOLIC BLOOD PRESSURE: 98 MMHG | TEMPERATURE: 99.8 F | SYSTOLIC BLOOD PRESSURE: 148 MMHG | WEIGHT: 201.2 LBS | HEIGHT: 63 IN | HEART RATE: 83 BPM

## 2021-06-07 DIAGNOSIS — M79.10 TENDON PAIN: ICD-10-CM

## 2021-06-07 DIAGNOSIS — I10 ESSENTIAL (PRIMARY) HYPERTENSION: ICD-10-CM

## 2021-06-07 DIAGNOSIS — R60.0 LOCALIZED EDEMA: Primary | ICD-10-CM

## 2021-06-07 PROCEDURE — 99214 OFFICE O/P EST MOD 30 MIN: CPT | Performed by: NURSE PRACTITIONER

## 2021-06-07 RX ORDER — OMEPRAZOLE 20 MG/1
20 CAPSULE, DELAYED RELEASE ORAL DAILY
COMMUNITY
Start: 2021-04-12 | End: 2023-02-13 | Stop reason: SDUPTHER

## 2021-06-07 RX ORDER — DICLOFENAC SODIUM 75 MG/1
75 TABLET, DELAYED RELEASE ORAL 2 TIMES DAILY PRN
Qty: 60 TABLET | Refills: 1 | Status: SHIPPED | OUTPATIENT
Start: 2021-06-07 | End: 2021-08-06

## 2021-06-07 RX ORDER — ATORVASTATIN CALCIUM 40 MG/1
40 TABLET, FILM COATED ORAL
COMMUNITY
Start: 2021-04-12 | End: 2022-07-25 | Stop reason: SDUPTHER

## 2021-06-07 RX ORDER — CITALOPRAM 40 MG/1
40 TABLET ORAL DAILY
COMMUNITY
Start: 2021-04-12 | End: 2023-02-22 | Stop reason: SDUPTHER

## 2021-06-07 RX ORDER — AMLODIPINE BESYLATE 2.5 MG/1
2.5 TABLET ORAL DAILY
COMMUNITY
Start: 2021-04-12 | End: 2022-05-31 | Stop reason: DRUGHIGH

## 2021-06-07 RX ORDER — TRAZODONE HYDROCHLORIDE 50 MG/1
50 TABLET ORAL NIGHTLY PRN
COMMUNITY
Start: 2021-05-09 | End: 2023-02-13 | Stop reason: SDUPTHER

## 2021-06-07 RX ORDER — LOSARTAN POTASSIUM 50 MG/1
50 TABLET ORAL 2 TIMES DAILY
COMMUNITY
Start: 2021-04-12 | End: 2023-02-13 | Stop reason: SDUPTHER

## 2021-06-07 NOTE — PROGRESS NOTES
"Chief Complaint  Foot Pain    Subjective          Daily Cartagena presents to [facility]   Right foot pain for the past 2 weeks Has tried changing shoes, has tried ice.  No improvement No injury  Pain is running up into the lower part of leg    Review of Systems   Constitutional: Negative for fatigue and fever.   Respiratory: Negative for cough, chest tightness and shortness of breath.    Cardiovascular: Negative for chest pain.   Musculoskeletal: Positive for gait problem (secondary to foot pain). Negative for back pain and neck pain.   Neurological: Negative for dizziness.   Psychiatric/Behavioral: Negative for behavioral problems and dysphoric mood.        Objective   Vital Signs:   /98 (BP Location: Left arm, Patient Position: Sitting)   Pulse 83   Temp 99.8 °F (37.7 °C) (Oral)   Ht 158.8 cm (62.5\")   Wt 91.3 kg (201 lb 3.2 oz)   BMI 36.21 kg/m²     Physical Exam  Constitutional:       General: She is not in acute distress.     Appearance: Normal appearance.   HENT:      Head: Normocephalic.   Cardiovascular:      Rate and Rhythm: Normal rate and regular rhythm.   Pulmonary:      Effort: Pulmonary effort is normal.      Breath sounds: Normal breath sounds.   Musculoskeletal:      Right foot: Decreased range of motion. Tenderness present.   Neurological:      General: No focal deficit present.      Mental Status: She is alert and oriented to person, place, and time.   Psychiatric:         Mood and Affect: Mood normal.         Behavior: Behavior normal.          Result Review :                           Assessment and Plan    Diagnoses and all orders for this visit:    1. Localized edema (Primary)  Comments:  improved - follow up PRN     2. Essential (primary) hypertension  Comments:  follow up in 4 weeks, sooner if remains elevated     3. Tendon pain  -     diclofenac (VOLTAREN) 75 MG EC tablet; Take 1 tablet by mouth 2 (Two) Times a Day As Needed (pain) for up to " 60 days.  Dispense: 60 tablet; Refill: 1        Follow Up   Return in about 4 weeks (around 7/5/2021) for Recheck foot pain.  Patient was given instructions and counseling regarding her condition or for health maintenance advice. Please see specific information pulled into the AVS if appropriate.

## 2021-07-01 VITALS
TEMPERATURE: 98.9 F | HEIGHT: 63 IN | DIASTOLIC BLOOD PRESSURE: 91 MMHG | WEIGHT: 194.4 LBS | HEART RATE: 57 BPM | SYSTOLIC BLOOD PRESSURE: 158 MMHG | BODY MASS INDEX: 34.45 KG/M2

## 2021-07-01 VITALS
SYSTOLIC BLOOD PRESSURE: 138 MMHG | HEIGHT: 63 IN | WEIGHT: 194.2 LBS | DIASTOLIC BLOOD PRESSURE: 84 MMHG | BODY MASS INDEX: 34.41 KG/M2 | HEART RATE: 65 BPM | TEMPERATURE: 98.2 F

## 2021-07-02 VITALS
HEART RATE: 91 BPM | BODY MASS INDEX: 34.23 KG/M2 | TEMPERATURE: 98.7 F | HEIGHT: 63 IN | DIASTOLIC BLOOD PRESSURE: 99 MMHG | SYSTOLIC BLOOD PRESSURE: 131 MMHG | WEIGHT: 193.2 LBS

## 2021-07-02 VITALS
SYSTOLIC BLOOD PRESSURE: 145 MMHG | TEMPERATURE: 98.5 F | DIASTOLIC BLOOD PRESSURE: 100 MMHG | BODY MASS INDEX: 34.62 KG/M2 | HEIGHT: 63 IN | HEART RATE: 79 BPM | WEIGHT: 195.4 LBS

## 2021-07-02 VITALS
DIASTOLIC BLOOD PRESSURE: 96 MMHG | HEIGHT: 63 IN | SYSTOLIC BLOOD PRESSURE: 131 MMHG | HEART RATE: 91 BPM | WEIGHT: 202.6 LBS | TEMPERATURE: 97.9 F | BODY MASS INDEX: 35.9 KG/M2

## 2022-04-12 ENCOUNTER — OFFICE VISIT (OUTPATIENT)
Dept: FAMILY MEDICINE CLINIC | Age: 62
End: 2022-04-12

## 2022-04-12 DIAGNOSIS — R50.9 FEVER, UNSPECIFIED FEVER CAUSE: ICD-10-CM

## 2022-04-12 DIAGNOSIS — J40 BRONCHITIS: ICD-10-CM

## 2022-04-12 DIAGNOSIS — I48.91 ATRIAL FIBRILLATION, UNSPECIFIED TYPE: Primary | ICD-10-CM

## 2022-04-12 DIAGNOSIS — I49.9 CARDIAC ARRHYTHMIA, UNSPECIFIED CARDIAC ARRHYTHMIA TYPE: ICD-10-CM

## 2022-04-12 DIAGNOSIS — J02.9 SORE THROAT: ICD-10-CM

## 2022-04-12 DIAGNOSIS — I10 ESSENTIAL (PRIMARY) HYPERTENSION: ICD-10-CM

## 2022-04-12 DIAGNOSIS — R00.0 TACHYCARDIA: ICD-10-CM

## 2022-04-12 DIAGNOSIS — R05.9 COUGH: ICD-10-CM

## 2022-04-12 PROBLEM — E78.5 HYPERLIPIDEMIA: Status: ACTIVE | Noted: 2022-04-12

## 2022-04-12 LAB
EXPIRATION DATE: NORMAL
EXPIRATION DATE: NORMAL
FLUAV AG UPPER RESP QL IA.RAPID: NOT DETECTED
FLUBV AG UPPER RESP QL IA.RAPID: NOT DETECTED
INTERNAL CONTROL: NORMAL
INTERNAL CONTROL: NORMAL
Lab: NORMAL
Lab: NORMAL
S PYO AG THROAT QL: NEGATIVE
SARS-COV-2 AG UPPER RESP QL IA.RAPID: NOT DETECTED

## 2022-04-12 PROCEDURE — 99214 OFFICE O/P EST MOD 30 MIN: CPT | Performed by: NURSE PRACTITIONER

## 2022-04-12 PROCEDURE — 93000 ELECTROCARDIOGRAM COMPLETE: CPT | Performed by: FAMILY MEDICINE

## 2022-04-12 PROCEDURE — 87428 SARSCOV & INF VIR A&B AG IA: CPT | Performed by: NURSE PRACTITIONER

## 2022-04-12 PROCEDURE — 87081 CULTURE SCREEN ONLY: CPT | Performed by: NURSE PRACTITIONER

## 2022-04-12 PROCEDURE — 87880 STREP A ASSAY W/OPTIC: CPT | Performed by: NURSE PRACTITIONER

## 2022-04-12 RX ORDER — DOXYCYCLINE 100 MG/1
100 CAPSULE ORAL 2 TIMES DAILY
Qty: 20 CAPSULE | Refills: 0 | Status: SHIPPED | OUTPATIENT
Start: 2022-04-12 | End: 2022-04-22

## 2022-04-12 NOTE — PROGRESS NOTES
Chief Complaint  Daily Cartagena presents to [facility] for Sore Throat, Cough, and Fever    Subjective          Upper Respiratory Infection  Patient complains of symptoms of a URI. Symptoms include congestion, cough described as productive and worsening over time, fever 100, headache described as with cough, nasal congestion and productive cough with  yellow and green colored sputum. Onset of symptoms was 4 days ago, and has been gradually worsening since that time. Treatment to date: decongestants and triaminic. She is a current smoker.       Daily is quite hypertensive today.  She reports that she has not taken her medication today due to not feeling well.   Upon review, she does not follow up regularly, and she is not taking her medications consistently as refills have not been provided regularly.      Her HR is irregular today - she reports that she does feel occasional palpitations but it is not often.  She has not previous history of known Afib                        Review of Systems      No Known Allergies   Past Medical History:   Diagnosis Date   • Acute upper respiratory infection, unspecified    • Anxiety disorder, unspecified    • Dorsalgia, unspecified    • Essential (primary) hypertension    • Heartburn    • HLD (hyperlipidemia)     unspecified   • Insomnia, unspecified    • Localized edema    • Nicotine dependence, unspecified, uncomplicated      Current Outpatient Medications   Medication Sig Dispense Refill   • amLODIPine (NORVASC) 2.5 MG tablet Take 2.5 mg by mouth Daily.     • atorvastatin (LIPITOR) 40 MG tablet Take 40 mg by mouth every night at bedtime.     • citalopram (CeleXA) 40 MG tablet Take 40 mg by mouth Daily.     • losartan (COZAAR) 50 MG tablet Take 50 mg by mouth 2 (Two) Times a Day.     • metoprolol tartrate (LOPRESSOR) 25 MG tablet Take 25 mg by mouth 2 (Two) Times a Day.     • omeprazole (priLOSEC) 20 MG capsule Take 20 mg by mouth Daily.     •  traZODone (DESYREL) 50 MG tablet Take 50 mg by mouth At Night As Needed.     • apixaban (ELIQUIS) 5 MG tablet tablet Take 1 tablet by mouth 2 (Two) Times a Day. 60 tablet 1   • doxycycline (MONODOX) 100 MG capsule Take 1 capsule by mouth 2 (Two) Times a Day for 10 days. 20 capsule 0     No current facility-administered medications for this visit.     Past Surgical History:   Procedure Laterality Date   • ENDOSCOPY  1999   • EXERCISE STRESS - CONVERTED  2014    treadmill stress test; NEG   • LAMINECTOMY      lumbar region      Social History     Tobacco Use   • Smoking status: Current Every Day Smoker     Packs/day: 1.00     Years: 28.00     Pack years: 28.00     Types: Cigarettes     Start date: 1994   • Smokeless tobacco: Never Used   • Tobacco comment: 1-5 cig per day; .5ppd   Vaping Use   • Vaping Use: Never used   Substance Use Topics   • Alcohol use: Yes     Comment: very seldom, maybe once or twice a week   • Drug use: Never     Family History   Problem Relation Age of Onset   • Hypertension Other    • Cancer Other         type not specified   • Diabetes type II Other      Health Maintenance Due   Topic Date Due   • MAMMOGRAM  Never done   • COLORECTAL CANCER SCREENING  Never done   • ANNUAL PHYSICAL  Never done   • Pneumococcal Vaccine 0-64 (1 - PCV) Never done   • TDAP/TD VACCINES (1 - Tdap) Never done   • ZOSTER VACCINE (1 of 2) Never done   • LUNG CANCER SCREENING  Never done   • HEPATITIS C SCREENING  Never done   • PAP SMEAR  Never done   • COVID-19 Vaccine (3 - Booster for Pfizer series) 01/16/2022   • LIPID PANEL  04/12/2022      Immunization History   Administered Date(s) Administered   • COVID-19 (PFIZER) PURPLE CAP 07/26/2021, 08/16/2021        Objective     Vitals:    04/12/22 1202 04/12/22 1206 04/12/22 1235   BP: (!) 177/123 (!) 166/134 (!) 168/102   BP Location:   Left arm   Patient Position:   Sitting   Pulse: 115 95    Temp: 100.5 °F (38.1 °C)     TempSrc: Oral     SpO2: 95%     Weight: 84.2  "kg (185 lb 9.6 oz)     Height: 158.8 cm (62.52\")       Body mass index is 33.38 kg/m².     Physical Exam  Vitals and nursing note reviewed.   Constitutional:       Appearance: She is ill-appearing.   HENT:      Right Ear: Tympanic membrane and ear canal normal. Drainage and tenderness present. No middle ear effusion. There is no impacted cerumen. Tympanic membrane is not scarred or erythematous.      Left Ear: Tympanic membrane and ear canal normal. Drainage and tenderness present.  No middle ear effusion. There is no impacted cerumen. Tympanic membrane is not scarred or erythematous.      Nose:      Right Sinus: No maxillary sinus tenderness or frontal sinus tenderness.      Left Sinus: No maxillary sinus tenderness or frontal sinus tenderness.      Mouth/Throat:      Pharynx: No pharyngeal swelling, oropharyngeal exudate, posterior oropharyngeal erythema or uvula swelling.      Tonsils: No tonsillar exudate. 0 on the right. 0 on the left.   Cardiovascular:      Rate and Rhythm: Tachycardia present. Rhythm irregular.   Pulmonary:      Effort: Pulmonary effort is normal.      Breath sounds: Normal breath sounds. No wheezing or rhonchi.   Lymphadenopathy:      Head:      Right side of head: No submandibular or preauricular adenopathy.      Left side of head: No submandibular or preauricular adenopathy.   Skin:     General: Skin is warm and dry.   Neurological:      Mental Status: She is alert and oriented to person, place, and time.   Psychiatric:         Attention and Perception: Attention and perception normal.         Behavior: Behavior normal.           Result Review :                        ECG 12 Lead    Date/Time: 4/12/2022 3:26 PM  Performed by: Sherice Church MD  Authorized by: Clarissa Vasques APRN   Comparison: not compared with previous ECG   Rhythm: atrial fibrillation  Rate: tachycardic  Conduction: right bundle branch block  QRS axis: normal  Other findings: non-specific ST-T wave " changes    Clinical impression: abnormal EKG  Comments: Patient is an active A. fib, unknown how long.  We will start her on Eliquis and continue beta-blocker and she has appointment to see cardiology tomorrow.  Dr. Church                Assessment and Plan      Diagnoses and all orders for this visit:    1. Atrial fibrillation, unspecified type (HCC) (Primary)  Comments:  called and spoke with cardiology - able to see her tomorrow - patient aware of appt date and location.  Dr. De La Cruz  Start on Eliquis/currently taking BB  Orders:  -     Ambulatory Referral to Cardiology  -     apixaban (ELIQUIS) 5 MG tablet tablet; Take 1 tablet by mouth 2 (Two) Times a Day.  Dispense: 60 tablet; Refill: 1    2. Essential (primary) hypertension  Comments:  take medication as soon as home, follow up with cardiology and instructed need to follow up in office regularly for tighter control on BP    3. Bronchitis  Comments:  bronchitis- DDx:  pneumonia/ copd exacerbation.  Instructed to follow up in office on Friday for close monitoring.  Orders:  -     doxycycline (MONODOX) 100 MG capsule; Take 1 capsule by mouth 2 (Two) Times a Day for 10 days.  Dispense: 20 capsule; Refill: 0    4. Fever, unspecified fever cause  Comments:  tylenol for fever   Orders:  -     POCT SARS-CoV-2 Antigen IFEANYI + Flu    5. Sore throat  -     POCT rapid strep A  -     Beta Strep Culture, Throat - , Throat; Future  -     Beta Strep Culture, Throat - Swab, Throat    6. Cough  Comments:  No more pseudephedrine;    may try mucinex     7. Tachycardia  -     ECG 12 Lead  -     Ambulatory Referral to Cardiology    8. Cardiac arrhythmia, unspecified cardiac arrhythmia type  -     ECG 12 Lead  -     Ambulatory Referral to Cardiology              Follow Up     No follow-ups on file.

## 2022-04-13 ENCOUNTER — OFFICE VISIT (OUTPATIENT)
Dept: CARDIOLOGY | Facility: CLINIC | Age: 62
End: 2022-04-13

## 2022-04-13 ENCOUNTER — TELEPHONE (OUTPATIENT)
Dept: FAMILY MEDICINE CLINIC | Age: 62
End: 2022-04-13

## 2022-04-13 VITALS
BODY MASS INDEX: 33.31 KG/M2 | HEIGHT: 62 IN | SYSTOLIC BLOOD PRESSURE: 154 MMHG | DIASTOLIC BLOOD PRESSURE: 112 MMHG | WEIGHT: 181 LBS | HEART RATE: 100 BPM

## 2022-04-13 VITALS
WEIGHT: 185.6 LBS | SYSTOLIC BLOOD PRESSURE: 168 MMHG | HEIGHT: 63 IN | BODY MASS INDEX: 32.89 KG/M2 | HEART RATE: 95 BPM | DIASTOLIC BLOOD PRESSURE: 102 MMHG | OXYGEN SATURATION: 95 % | TEMPERATURE: 100.5 F

## 2022-04-13 DIAGNOSIS — I10 ESSENTIAL (PRIMARY) HYPERTENSION: ICD-10-CM

## 2022-04-13 DIAGNOSIS — I48.19 PERSISTENT ATRIAL FIBRILLATION: Primary | ICD-10-CM

## 2022-04-13 PROCEDURE — 99204 OFFICE O/P NEW MOD 45 MIN: CPT | Performed by: INTERNAL MEDICINE

## 2022-04-13 RX ORDER — METOPROLOL TARTRATE 50 MG/1
50 TABLET, FILM COATED ORAL 2 TIMES DAILY
Qty: 180 TABLET | Refills: 1 | Status: SHIPPED | OUTPATIENT
Start: 2022-04-13 | End: 2022-06-28 | Stop reason: DRUGHIGH

## 2022-04-13 NOTE — ASSESSMENT & PLAN NOTE
New diagnosis for the patient.  She is currently in atrial fibrillation with moderately controlled ventricular rates.  The nature of the condition, pathophysiology, potential complications including risk of stroke and the various management options were discussed in detail with the patient.  Prescription for Eliquis was sent yesterday however patient has not picked it up yet.  We will give samples today and strongly counseled regarding medication compliance.  Since the heart rate is still elevated, we will increase the dose of metoprolol tartrate to 50 mg twice daily.  Patient will be scheduled for an echocardiogram to rule out significant valvular abnormalities and assess the LV function.  Thyroid panel, CMP and CBC to be done.  She will eventually need a stress testing to rule out ischemia as etiology but will wait until acute issues including respiratory symptoms subside.

## 2022-04-13 NOTE — PROGRESS NOTES
"  CARDIOLOGY INITIAL CONSULT       Chief Complaint  Atrial Fibrillation (New patient)    Subjective            Daily Cartagena presents to [facility] CARDIOLOGY  History of Present Illness    This is a 61-year-old female with hypertension and hyperlipidemia, who was referred for cardiac evaluation because of new diagnosis of atrial fibrillation.  She was seen in PCP office for upper respiratory symptoms including cough, low-grade fever and sputum production.  Heart rate was noted to be regular and subsequent EKG confirmed presence of atrial fibrillation with RVR.  She was already on metoprolol which was continued.  A prescription for Eliquis was sent to the pharmacy however patient has not picked it up yet.  She was started on antibiotics yesterday and is somewhat feeling better with her cough.    She feels \"skipped beats\" intermittently for a very long time.  Denies having any fluttering sensation in the chest, dizziness or syncopal episodes.  She denies having any chest pain.  No recent cardiac work-up available.      Past History:    Medical History:  Past Medical History:   Diagnosis Date   • Acute upper respiratory infection, unspecified    • Anxiety disorder, unspecified    • Dorsalgia, unspecified    • Essential (primary) hypertension    • Heartburn    • HLD (hyperlipidemia)     unspecified   • Insomnia, unspecified    • Localized edema    • Nicotine dependence, unspecified, uncomplicated        Surgical History: has a past surgical history that includes Laminectomy; Esophagogastroduodenoscopy (1999); and Exercise Stress - Converted (2014).     Family History: family history includes Cancer in an other family member; Diabetes type II in an other family member; Hypertension in an other family member.     Social History: reports that she has been smoking cigarettes. She started smoking about 28 years ago. She has a 28.00 pack-year smoking history. She has never used smokeless tobacco. She " "reports current alcohol use. She reports that she does not use drugs.    Allergies: Patient has no known allergies.    Current Outpatient Medications on File Prior to Visit   Medication Sig   • amLODIPine (NORVASC) 2.5 MG tablet Take 2.5 mg by mouth Daily.   • apixaban (ELIQUIS) 5 MG tablet tablet Take 1 tablet by mouth 2 (Two) Times a Day.   • atorvastatin (LIPITOR) 40 MG tablet Take 40 mg by mouth every night at bedtime.   • citalopram (CeleXA) 40 MG tablet Take 40 mg by mouth Daily.   • doxycycline (MONODOX) 100 MG capsule Take 1 capsule by mouth 2 (Two) Times a Day for 10 days.   • losartan (COZAAR) 50 MG tablet Take 50 mg by mouth 2 (Two) Times a Day.   • omeprazole (priLOSEC) 20 MG capsule Take 20 mg by mouth Daily.   • traZODone (DESYREL) 50 MG tablet Take 50 mg by mouth At Night As Needed.   •  metoprolol tartrate (LOPRESSOR) 25 MG tablet Take 25 mg by mouth 2 (Two) Times a Day.       Review of Systems   Constitutional: Positive for fever. Negative for fatigue, unexpected weight gain and unexpected weight loss.   Eyes: Negative for double vision.   Respiratory: Positive for cough and shortness of breath. Negative for wheezing.    Cardiovascular: Positive for palpitations. Negative for chest pain and leg swelling.   Gastrointestinal: Negative for abdominal pain, nausea and vomiting.   Endocrine: Negative for cold intolerance, heat intolerance, polydipsia and polyuria.   Musculoskeletal: Negative for arthralgias and back pain.   Skin: Negative for color change.   Neurological: Negative for dizziness, syncope, weakness and headache.   Hematological: Does not bruise/bleed easily.        Objective     BP (!) 154/112   Pulse 100   Ht 157.5 cm (62\")   Wt 82.1 kg (181 lb)   BMI 33.11 kg/m²       Physical Exam  Constitutional:       General: She is awake. She is not in acute distress.     Appearance: Normal appearance.   Eyes:      Extraocular Movements: Extraocular movements intact.      Pupils: Pupils are " equal, round, and reactive to light.   Neck:      Thyroid: No thyromegaly.      Vascular: No carotid bruit or JVD.   Cardiovascular:      Rate and Rhythm: Tachycardia present. Rhythm irregular.      Chest Wall: PMI is not displaced.      Heart sounds: Normal heart sounds, S1 normal and S2 normal. No murmur heard.    No friction rub. No gallop. No S3 or S4 sounds.   Pulmonary:      Effort: Pulmonary effort is normal. No respiratory distress.      Breath sounds: Normal breath sounds. No wheezing, rhonchi or rales.   Abdominal:      General: Bowel sounds are normal.      Palpations: Abdomen is soft.      Tenderness: There is no abdominal tenderness.   Musculoskeletal:      Cervical back: Neck supple.      Right lower leg: No edema.      Left lower leg: No edema.   Skin:     Nails: There is no clubbing.   Neurological:      General: No focal deficit present.      Mental Status: She is alert and oriented to person, place, and time.               The following data was reviewed by: Jason De La Cruz MD on 04/13/2022:    No recent labs available for review           Data reviewed: Cardiology studies        EKG done at PCP office on 4/12/2022 showed atrial fibrillation with rapid ventricular rate, LVH                 Assessment and Plan        Diagnoses and all orders for this visit:    1. Persistent atrial fibrillation (HCC) (Primary)  Assessment & Plan:  New diagnosis for the patient.  She is currently in atrial fibrillation with moderately controlled ventricular rates.  The nature of the condition, pathophysiology, potential complications including risk of stroke and the various management options were discussed in detail with the patient.  Prescription for Eliquis was sent yesterday however patient has not picked it up yet.  We will give samples today and strongly counseled regarding medication compliance.  Since the heart rate is still elevated, we will increase the dose of metoprolol tartrate to 50 mg twice daily.   Patient will be scheduled for an echocardiogram to rule out significant valvular abnormalities and assess the LV function.  Thyroid panel, CMP and CBC to be done.  She will eventually need a stress testing to rule out ischemia as etiology but will wait until acute issues including respiratory symptoms subside.    Orders:  -     apixaban (ELIQUIS) 5 MG tablet tablet; Take 1 tablet by mouth 2 (Two) Times a Day. [id number]  Exp 5/24  x2  Dispense: 28 tablet; Refill: 0  -     metoprolol tartrate (LOPRESSOR) 50 MG tablet; Take 1 tablet by mouth 2 (Two) Times a Day.  Dispense: 180 tablet; Refill: 1  -     Adult Transthoracic Echo Complete w/ Color, Spectral and Contrast if necessary per protocol; Future  -     CBC (No Diff); Future  -     TSH; Future  -     Comprehensive Metabolic Panel; Future  -     Magnesium; Future    2. Essential (primary) hypertension  Assessment & Plan:  Blood pressure on the higher side in the office today.  Patient admits that she is taking medications only intermittently but took everything this morning.  Continue amlodipine and losartan at the current dose, but increasing metoprolol to 50 mg twice daily.        I spent 26 minutes caring for Daily on this date of service. This time includes time spent by me in the following activities:reviewing tests, obtaining and/or reviewing a separately obtained history, performing a medically appropriate examination and/or evaluation , ordering medications, tests, or procedures, and documenting information in the medical record    Follow Up {Instructions Charge Capture  Follow-up Communications :23}    Return in about 6 weeks (around 5/25/2022) for with Stacey MOJICA.    Patient was given instructions and counseling regarding her condition or for health maintenance advice. Please see specific information pulled into the AVS if appropriate.

## 2022-04-13 NOTE — TELEPHONE ENCOUNTER
Call and make sure she is aware to  the Eliquis at [facility] - and make sure she gets in with Dr. De La Cruz today as recommended  Stress the importance of following up and being compliant with the medication as could lead to stroke if not treated.  See what her blood pressure is running and tell her I did make her an appt for Friday at 1:45 to follow up with me as we discussed       Pt inf and also given info on copay card on website to try to use for cost saving

## 2022-04-13 NOTE — ASSESSMENT & PLAN NOTE
Blood pressure on the higher side in the office today.  Patient admits that she is taking medications only intermittently but took everything this morning.  Continue amlodipine and losartan at the current dose, but increasing metoprolol to 50 mg twice daily.

## 2022-04-14 LAB — BACTERIA SPEC AEROBE CULT: NORMAL

## 2022-04-15 ENCOUNTER — LAB (OUTPATIENT)
Dept: LAB | Facility: HOSPITAL | Age: 62
End: 2022-04-15

## 2022-04-15 ENCOUNTER — OFFICE VISIT (OUTPATIENT)
Dept: FAMILY MEDICINE CLINIC | Age: 62
End: 2022-04-15

## 2022-04-15 VITALS
HEIGHT: 62 IN | TEMPERATURE: 99.2 F | HEART RATE: 83 BPM | BODY MASS INDEX: 41.99 KG/M2 | WEIGHT: 228.2 LBS | DIASTOLIC BLOOD PRESSURE: 91 MMHG | SYSTOLIC BLOOD PRESSURE: 124 MMHG

## 2022-04-15 DIAGNOSIS — I48.91 ATRIAL FIBRILLATION, UNSPECIFIED TYPE: ICD-10-CM

## 2022-04-15 DIAGNOSIS — I48.19 PERSISTENT ATRIAL FIBRILLATION: ICD-10-CM

## 2022-04-15 DIAGNOSIS — R05.9 COUGH: ICD-10-CM

## 2022-04-15 DIAGNOSIS — I10 ESSENTIAL (PRIMARY) HYPERTENSION: ICD-10-CM

## 2022-04-15 DIAGNOSIS — J40 BRONCHITIS: Primary | ICD-10-CM

## 2022-04-15 LAB
ALBUMIN SERPL-MCNC: 4.1 G/DL (ref 3.5–5.2)
ALBUMIN/GLOB SERPL: 1.7 G/DL
ALP SERPL-CCNC: 126 U/L (ref 39–117)
ALT SERPL W P-5'-P-CCNC: 10 U/L (ref 1–33)
ANION GAP SERPL CALCULATED.3IONS-SCNC: 9.5 MMOL/L (ref 5–15)
AST SERPL-CCNC: 17 U/L (ref 1–32)
BILIRUB SERPL-MCNC: 0.3 MG/DL (ref 0–1.2)
BUN SERPL-MCNC: 14 MG/DL (ref 8–23)
BUN/CREAT SERPL: 17.1 (ref 7–25)
CALCIUM SPEC-SCNC: 9.1 MG/DL (ref 8.6–10.5)
CHLORIDE SERPL-SCNC: 107 MMOL/L (ref 98–107)
CO2 SERPL-SCNC: 24.5 MMOL/L (ref 22–29)
CREAT SERPL-MCNC: 0.82 MG/DL (ref 0.57–1)
DEPRECATED RDW RBC AUTO: 48.6 FL (ref 37–54)
EGFRCR SERPLBLD CKD-EPI 2021: 81.5 ML/MIN/1.73
ERYTHROCYTE [DISTWIDTH] IN BLOOD BY AUTOMATED COUNT: 13.9 % (ref 12.3–15.4)
GLOBULIN UR ELPH-MCNC: 2.4 GM/DL
GLUCOSE SERPL-MCNC: 91 MG/DL (ref 65–99)
HCT VFR BLD AUTO: 47.3 % (ref 34–46.6)
HGB BLD-MCNC: 14.7 G/DL (ref 12–15.9)
MAGNESIUM SERPL-MCNC: 2 MG/DL (ref 1.6–2.4)
MCH RBC QN AUTO: 29 PG (ref 26.6–33)
MCHC RBC AUTO-ENTMCNC: 31.1 G/DL (ref 31.5–35.7)
MCV RBC AUTO: 93.3 FL (ref 79–97)
PLATELET # BLD AUTO: 149 10*3/MM3 (ref 140–450)
PMV BLD AUTO: 10.5 FL (ref 6–12)
POTASSIUM SERPL-SCNC: 3.8 MMOL/L (ref 3.5–5.2)
PROT SERPL-MCNC: 6.5 G/DL (ref 6–8.5)
RBC # BLD AUTO: 5.07 10*6/MM3 (ref 3.77–5.28)
SODIUM SERPL-SCNC: 141 MMOL/L (ref 136–145)
TSH SERPL DL<=0.05 MIU/L-ACNC: 4.74 UIU/ML (ref 0.27–4.2)
WBC NRBC COR # BLD: 3.89 10*3/MM3 (ref 3.4–10.8)

## 2022-04-15 PROCEDURE — 99213 OFFICE O/P EST LOW 20 MIN: CPT | Performed by: NURSE PRACTITIONER

## 2022-04-15 PROCEDURE — 83735 ASSAY OF MAGNESIUM: CPT

## 2022-04-15 PROCEDURE — 36415 COLL VENOUS BLD VENIPUNCTURE: CPT

## 2022-04-15 PROCEDURE — 84443 ASSAY THYROID STIM HORMONE: CPT

## 2022-04-15 PROCEDURE — 80053 COMPREHEN METABOLIC PANEL: CPT

## 2022-04-15 PROCEDURE — 85027 COMPLETE CBC AUTOMATED: CPT

## 2022-04-15 RX ORDER — BENZONATATE 200 MG/1
200 CAPSULE ORAL 3 TIMES DAILY PRN
Qty: 30 CAPSULE | Refills: 0 | Status: SHIPPED | OUTPATIENT
Start: 2022-04-15 | End: 2022-05-31 | Stop reason: ALTCHOICE

## 2022-04-15 RX ORDER — GUAIFENESIN 600 MG/1
600 TABLET, EXTENDED RELEASE ORAL 2 TIMES DAILY PRN
Qty: 20 TABLET | Refills: 0 | Status: SHIPPED | OUTPATIENT
Start: 2022-04-15 | End: 2023-02-22

## 2022-04-15 RX ORDER — METHYLPREDNISOLONE 4 MG/1
TABLET ORAL
Qty: 21 TABLET | Refills: 0 | Status: SHIPPED | OUTPATIENT
Start: 2022-04-15 | End: 2022-05-31 | Stop reason: ALTCHOICE

## 2022-04-15 NOTE — PROGRESS NOTES
Chief Complaint  Daily Cartagena presents to [facility] for Atrial Fibrillation (Follow up/) and Bronchitis (Pt states that she still has ongoing cough./)    Subjective          Daily is here for follow up on Afib, bronchitis. She reports that she has improved in the way that she feels, but the coughing is still very bothersome, mostly at night.      Regarding Afib, she is currently taking metoprolol and Eliquis.  She did get samples from dr. De La Cruz at her visit and has an enrollment card for assistance.  She is aware of compliance and risk of non compliance.          Review of Systems      No Known Allergies   Past Medical History:   Diagnosis Date   • Acute upper respiratory infection, unspecified    • Anxiety disorder, unspecified    • Dorsalgia, unspecified    • Essential (primary) hypertension    • Heartburn    • HLD (hyperlipidemia)     unspecified   • Insomnia, unspecified    • Localized edema    • Nicotine dependence, unspecified, uncomplicated      Current Outpatient Medications   Medication Sig Dispense Refill   • amLODIPine (NORVASC) 2.5 MG tablet Take 2.5 mg by mouth Daily.     • apixaban (ELIQUIS) 5 MG tablet tablet Take 1 tablet by mouth 2 (Two) Times a Day. 60 tablet 1   • apixaban (ELIQUIS) 5 MG tablet tablet Take 1 tablet by mouth 2 (Two) Times a Day. [id number]  Exp 5/24  x2 28 tablet 0   • atorvastatin (LIPITOR) 40 MG tablet Take 40 mg by mouth every night at bedtime.     • citalopram (CeleXA) 40 MG tablet Take 40 mg by mouth Daily.     • doxycycline (MONODOX) 100 MG capsule Take 1 capsule by mouth 2 (Two) Times a Day for 10 days. 20 capsule 0   • losartan (COZAAR) 50 MG tablet Take 50 mg by mouth 2 (Two) Times a Day.     • metoprolol tartrate (LOPRESSOR) 50 MG tablet Take 1 tablet by mouth 2 (Two) Times a Day. 180 tablet 1   • omeprazole (priLOSEC) 20 MG capsule Take 20 mg by mouth Daily.     • traZODone (DESYREL) 50 MG tablet Take 50 mg by mouth At Night As  Needed.     • benzonatate (TESSALON) 200 MG capsule Take 1 capsule by mouth 3 (Three) Times a Day As Needed for Cough. 30 capsule 0   • guaiFENesin (MUCINEX) 600 MG 12 hr tablet Take 1 tablet by mouth 2 (Two) Times a Day As Needed for Cough or Congestion. 20 tablet 0   • methylPREDNISolone (Medrol) 4 MG dose pack Take as directed on package instructions. 21 tablet 0     No current facility-administered medications for this visit.     Past Surgical History:   Procedure Laterality Date   • ENDOSCOPY  1999   • EXERCISE STRESS - CONVERTED  2014    treadmill stress test; NEG   • LAMINECTOMY      lumbar region      Social History     Tobacco Use   • Smoking status: Current Every Day Smoker     Packs/day: 1.00     Years: 28.00     Pack years: 28.00     Types: Cigarettes     Start date: 1994   • Smokeless tobacco: Never Used   • Tobacco comment: 1-5 cig per day; .5ppd   Vaping Use   • Vaping Use: Never used   Substance Use Topics   • Alcohol use: Yes     Comment: very seldom, maybe once or twice a week   • Drug use: Never     Family History   Problem Relation Age of Onset   • Hypertension Other    • Cancer Other         type not specified   • Diabetes type II Other      Health Maintenance Due   Topic Date Due   • MAMMOGRAM  Never done   • COLORECTAL CANCER SCREENING  Never done   • ANNUAL PHYSICAL  Never done   • Pneumococcal Vaccine 0-64 (1 - PCV) Never done   • TDAP/TD VACCINES (1 - Tdap) Never done   • ZOSTER VACCINE (1 of 2) Never done   • LUNG CANCER SCREENING  Never done   • HEPATITIS C SCREENING  Never done   • PAP SMEAR  Never done   • COVID-19 Vaccine (3 - Booster for Pfizer series) 01/16/2022   • LIPID PANEL  04/12/2022      Immunization History   Administered Date(s) Administered   • COVID-19 (PFIZER) PURPLE CAP 07/26/2021, 08/16/2021        Objective     Vitals:    04/15/22 1330   BP: 124/91   BP Location: Right arm   Patient Position: Sitting   Pulse: 83   Temp: 99.2 °F (37.3 °C)   TempSrc: Oral   Weight: 104  "kg (228 lb 3.2 oz)   Height: 157.5 cm (62\")     Body mass index is 41.74 kg/m².     Physical Exam  Constitutional:       General: She is not in acute distress.     Appearance: Normal appearance.   HENT:      Head: Normocephalic.   Cardiovascular:      Rate and Rhythm: Normal rate and regular rhythm.   Pulmonary:      Effort: Pulmonary effort is normal.      Breath sounds: Examination of the right-upper field reveals wheezing. Decreased breath sounds and wheezing present.   Musculoskeletal:         General: Normal range of motion.   Neurological:      General: No focal deficit present.      Mental Status: She is alert and oriented to person, place, and time.   Psychiatric:         Mood and Affect: Mood normal.         Behavior: Behavior normal.           Result Review :                               Assessment and Plan      Diagnoses and all orders for this visit:    1. Bronchitis (Primary)  Comments:  continue antibiotics until complete, OK to be off work until Monday - may extend if persists with cough/ fatigue if needed.      Orders:  -     methylPREDNISolone (Medrol) 4 MG dose pack; Take as directed on package instructions.  Dispense: 21 tablet; Refill: 0    2. Atrial fibrillation, unspecified type (HCC)    3. Essential (primary) hypertension  Comments:  BP looks good today,  continued compliance with medication     4. Cough  Comments:  mucinex, robitussin OTC     Other orders  -     guaiFENesin (MUCINEX) 600 MG 12 hr tablet; Take 1 tablet by mouth 2 (Two) Times a Day As Needed for Cough or Congestion.  Dispense: 20 tablet; Refill: 0  -     benzonatate (TESSALON) 200 MG capsule; Take 1 capsule by mouth 3 (Three) Times a Day As Needed for Cough.  Dispense: 30 capsule; Refill: 0              Follow Up     Return if symptoms worsen or fail to improve.             "

## 2022-04-22 NOTE — PROGRESS NOTES
Recent labs including kidney functions, liver enzymes, blood counts, hemoglobin, sodium, potassium, magnesium are all within normal limits.    TSH (marker of thyroid function) is slightly increased, but generally, treatment with medications are not required at this level.    Follow-up as scheduled earlier, continue current medicines      Electronically signed by Jason De La Cruz MD, 04/22/22, 1:36 PM EDT.

## 2022-04-26 ENCOUNTER — TELEPHONE (OUTPATIENT)
Dept: FAMILY MEDICINE CLINIC | Age: 62
End: 2022-04-26

## 2022-04-29 NOTE — TELEPHONE ENCOUNTER
Call the patient and see if she was able to get on the program for coverage we discussed or if she has samples from cardiology .  If not, refer to Ingrid Jauregui for assistance

## 2022-05-09 NOTE — TELEPHONE ENCOUNTER
Returned pt call.  Pt stated that her Eliquis and xarelto were to expensive.  She can not afford the medication.  I have given her 3 weeks worth of samples.  (That is all we had in office.)    Please advise.

## 2022-05-25 NOTE — PROGRESS NOTES
Chief Complaint  Atrial Fibrillation, Hypertension, and Hyperlipidemia    Subjective        Daily Cartagena presents to [facility] CARDIOLOGY  She is a 61-year-old white female who comes in to evaluate her new onset atrial fibrillation, hypertension and hyperlipidemia. Denies any chest pains, shortness of breath, palpitations, dizziness, syncope, swelling, PND, or orthopnea.  Cardiac wise she has no complaints.  Admits does not check blood pressures at home.  States tends to be high when she comes to the doctor's office.  Admits she is under a lot of stress with her job.  Continues to smoke at least 5 cigarettes a day.  Eliquis was cost prohibitive.  Xarelto is also very expensive.  She is currently using samples.        Past History:    Past Medical History:   Diagnosis Date   • Acute upper respiratory infection, unspecified    • Anxiety disorder, unspecified    • Dorsalgia, unspecified    • Essential (primary) hypertension    • Heartburn    • HLD (hyperlipidemia)     unspecified   • Insomnia, unspecified    • Localized edema    • Nicotine dependence, unspecified, uncomplicated         Family History: family history includes Cancer in an other family member; Diabetes type II in an other family member; Hypertension in an other family member.     Social History: reports that she has been smoking cigarettes. She started smoking about 28 years ago. She has a 28.00 pack-year smoking history. She has never used smokeless tobacco. She reports current alcohol use. She reports that she does not use drugs.    Allergies: Patient has no known allergies.    Past Surgical History:   Procedure Laterality Date   • ENDOSCOPY  1999   • EXERCISE STRESS - CONVERTED  2014    treadmill stress test; NEG   • LAMINECTOMY      lumbar region          Current Outpatient Medications:   •  atorvastatin (LIPITOR) 40 MG tablet, Take 40 mg by mouth every night at bedtime., Disp: , Rfl:   •  citalopram (CeleXA) 40 MG tablet,  Take 40 mg by mouth Daily., Disp: , Rfl:   •  guaiFENesin (MUCINEX) 600 MG 12 hr tablet, Take 1 tablet by mouth 2 (Two) Times a Day As Needed for Cough or Congestion., Disp: 20 tablet, Rfl: 0  •  losartan (COZAAR) 50 MG tablet, Take 50 mg by mouth 2 (Two) Times a Day., Disp: , Rfl:   •  metoprolol tartrate (LOPRESSOR) 50 MG tablet, Take 1 tablet by mouth 2 (Two) Times a Day., Disp: 180 tablet, Rfl: 1  •  omeprazole (priLOSEC) 20 MG capsule, Take 20 mg by mouth Daily., Disp: , Rfl:   •  rivaroxaban (Xarelto) 20 MG tablet, Take 1 tablet by mouth Daily With Dinner., Disp: 90 tablet, Rfl: 3  •  traZODone (DESYREL) 50 MG tablet, Take 50 mg by mouth At Night As Needed., Disp: , Rfl:   •  amLODIPine (NORVASC) 5 MG tablet, Take 1 tablet by mouth Daily., Disp: 90 tablet, Rfl: 3  •  rivaroxaban (Xarelto) 20 MG tablet, Take 1 tablet by mouth Daily., Disp: 28 tablet, Rfl: 0     Medications Discontinued During This Encounter   Medication Reason   • rivaroxaban (Xarelto) 20 MG tablet Duplicate order   • methylPREDNISolone (Medrol) 4 MG dose pack Discontinued by another clinician   • benzonatate (TESSALON) 200 MG capsule Discontinued by another clinician   • amLODIPine (NORVASC) 2.5 MG tablet Dose adjustment        Review of Systems   Constitutional: Negative for fatigue.   Respiratory: Negative for cough and shortness of breath.    Cardiovascular: Negative for chest pain, palpitations and leg swelling.   Neurological: Negative for dizziness.   All other systems reviewed and are negative.       Objective     Physical Exam  Constitutional:       General: She is not in acute distress.     Appearance: She is obese.   Neck:      Vascular: No carotid bruit.   Cardiovascular:      Rate and Rhythm: Normal rate. Rhythm irregular.      Heart sounds: Murmur (Faint at the apex) heard.   Pulmonary:      Effort: Pulmonary effort is normal.      Breath sounds: Normal breath sounds.      Comments: Increase AP diameter  Musculoskeletal:       "Right lower leg: No edema.      Left lower leg: No edema.   Neurological:      Mental Status: She is alert.       BP (!) 170/122   Pulse 75   Ht 157.5 cm (62\")   Wt 84.8 kg (187 lb)   BMI 34.20 kg/m²       Vitals:    05/31/22 0942   BP: (!) 170/122   Pulse:        Result Review :         The following data was reviewed by: GALINA Johnson on 05/31/2022:        CMP    CMP 4/15/22   Glucose 91   BUN 14   Creatinine 0.82   Sodium 141   Potassium 3.8   Chloride 107   Calcium 9.1   Albumin 4.10   Total Bilirubin 0.3   Alkaline Phosphatase 126 (A)   AST (SGOT) 17   ALT (SGPT) 10   (A) Abnormal value            CBC w/diff    CBC w/Diff 4/15/22   WBC 3.89   RBC 5.07   Hemoglobin 14.7   Hematocrit 47.3 (A)   MCV 93.3   MCH 29.0   MCHC 31.1 (A)   RDW 13.9   Platelets 149   (A) Abnormal value                Lab Results   Component Value Date    TSH 4.740 (H) 04/15/2022     Magnesium   Date Value Ref Range Status   04/15/2022 2.0 1.6 - 2.4 mg/dL Final         Last Echo  Results for orders placed in visit on 05/26/22    Adult Transthoracic Echo Complete w/ Color, Spectral and Contrast if necessary per protocol    Interpretation Summary  · Estimated left ventricular EF = 55% Left ventricular systolic function is normal.  · There is diastolic dysfunction of the left ventricle.  · There is mild to moderate biatrial enlargement.  · There is mild mitral regurgitation.  · There is mild tricuspid regurgitation. Estimated right ventricular systolic pressure from tricuspid regurgitation is mildly elevated (35-45 mmHg).  · Patient was noted to be in atrial fibrillation with controlled ventricular rates at the time of examination.              Assessment and Plan    Diagnoses and all orders for this visit:    1. Essential (primary) hypertension (Primary)  Assessment & Plan:  Needs tighter control.  Increase amlodipine to 5 mg a day.  Continue losartan 50 mg twice daily Metroprolol tartrate 50 mg twice daily.  She will do a blood " pressure log and adjust accordingly.  We will follow-up in 1 month.    Orders:  -     amLODIPine (NORVASC) 5 MG tablet; Take 1 tablet by mouth Daily.  Dispense: 90 tablet; Refill: 3    2. Hyperlipidemia, unspecified hyperlipidemia type  Assessment & Plan:  Unknown control.  We will repeat lipids before her appointment in 1 month    Orders:  -     Lipid Panel; Future    3. Cigarette nicotine dependence with nicotine-induced disorder  Assessment & Plan:  I have educated the patient on the risk of diseases from using tobacco products such as heart disease. Patient verbalizes understanding of the need for smoking cessation but is unwilling to attempt at this time.  Declined any aids.  Encouraged her to titrate her use down weekly.        4. Persistent atrial fibrillation (HCC)  Assessment & Plan:  Currently in atrial fibrillation.  Rate better controlled.  We will try to get her patient assistance with the company for her Xarelto.  Continue Xarelto 20 mg and metoprolol tartrate 50 twice daily.    Orders:  -     rivaroxaban (Xarelto) 20 MG tablet; Take 1 tablet by mouth Daily.  Dispense: 28 tablet; Refill: 0        Follow Up     Return in about 4 weeks (around 6/28/2022) for with Stacey.    Patient was given instructions and counseling regarding her condition or for health maintenance advice. Please see specific information pulled into the AVS if appropriate.       GALINA Oakley  06/24/22 07:25 EDT

## 2022-05-31 ENCOUNTER — OFFICE VISIT (OUTPATIENT)
Dept: CARDIOLOGY | Facility: CLINIC | Age: 62
End: 2022-05-31

## 2022-05-31 ENCOUNTER — TELEPHONE (OUTPATIENT)
Dept: CARDIOLOGY | Facility: CLINIC | Age: 62
End: 2022-05-31

## 2022-05-31 VITALS
SYSTOLIC BLOOD PRESSURE: 170 MMHG | HEART RATE: 75 BPM | DIASTOLIC BLOOD PRESSURE: 122 MMHG | HEIGHT: 62 IN | WEIGHT: 187 LBS | BODY MASS INDEX: 34.41 KG/M2

## 2022-05-31 DIAGNOSIS — F17.219 CIGARETTE NICOTINE DEPENDENCE WITH NICOTINE-INDUCED DISORDER: Chronic | ICD-10-CM

## 2022-05-31 DIAGNOSIS — E78.5 HYPERLIPIDEMIA, UNSPECIFIED HYPERLIPIDEMIA TYPE: Chronic | ICD-10-CM

## 2022-05-31 DIAGNOSIS — I48.19 PERSISTENT ATRIAL FIBRILLATION: ICD-10-CM

## 2022-05-31 DIAGNOSIS — I10 ESSENTIAL (PRIMARY) HYPERTENSION: Primary | Chronic | ICD-10-CM

## 2022-05-31 PROCEDURE — 99214 OFFICE O/P EST MOD 30 MIN: CPT | Performed by: NURSE PRACTITIONER

## 2022-05-31 RX ORDER — AMLODIPINE BESYLATE 5 MG/1
5 TABLET ORAL DAILY
Qty: 90 TABLET | Refills: 3 | Status: SHIPPED | OUTPATIENT
Start: 2022-05-31 | End: 2023-02-22 | Stop reason: SDUPTHER

## 2022-05-31 NOTE — ASSESSMENT & PLAN NOTE
Needs tighter control.  Increase amlodipine to 5 mg a day.  Continue losartan 50 mg twice daily Metroprolol tartrate 50 mg twice daily.  She will do a blood pressure log and adjust accordingly.  We will follow-up in 1 month.

## 2022-05-31 NOTE — ASSESSMENT & PLAN NOTE
I have educated the patient on the risk of diseases from using tobacco products such as heart disease. Patient verbalizes understanding of the need for smoking cessation but is unwilling to attempt at this time.  Declined any aids.  Encouraged her to titrate her use down weekly.

## 2022-05-31 NOTE — ASSESSMENT & PLAN NOTE
Currently in atrial fibrillation.  Rate better controlled.  We will try to get her patient assistance with the company for her Xarelto.  Continue Xarelto 20 mg and metoprolol tartrate 50 twice daily.

## 2022-05-31 NOTE — PATIENT INSTRUCTIONS
Increase Amlodipine 2.5 mg to two tablets a day till bottle done then get the new dose of Amlodipine 5 mg one tablet a day

## 2022-06-24 NOTE — PROGRESS NOTES
Chief Complaint  Hypertension and Hyperlipidemia    Subjective        Daily Cartagena presents to [facility] CARDIOLOGY  61-year-old white female with recent onset of atrial fibrillation comes in to evaluate her hypertension after recent changes.  Blood pressures range from 135/110-179/126.  Heart rates run from . Denies any chest pains, shortness of breath, palpitations, dizziness, syncope, swelling, PND, or orthopnea.  Cardiac wise she has no further complaints.  He continues to smoke but she is trying to stop by cutting down.       Past History:    Past Medical History:   Diagnosis Date   • Acute upper respiratory infection, unspecified    • Anxiety disorder, unspecified    • Dorsalgia, unspecified    • Essential (primary) hypertension    • Heartburn    • HLD (hyperlipidemia)     unspecified   • Insomnia, unspecified    • Localized edema    • Nicotine dependence, unspecified, uncomplicated         Family History: family history includes Cancer in an other family member; Diabetes type II in an other family member; Hypertension in an other family member.     Social History: reports that she has been smoking cigarettes. She started smoking about 28 years ago. She has a 28.00 pack-year smoking history. She has never used smokeless tobacco. She reports current alcohol use. She reports that she does not use drugs.    Allergies: Patient has no known allergies.    Past Surgical History:   Procedure Laterality Date   • ENDOSCOPY  1999   • EXERCISE STRESS - CONVERTED  2014    treadmill stress test; NEG   • LAMINECTOMY      lumbar region          Current Outpatient Medications:   •  amLODIPine (NORVASC) 5 MG tablet, Take 1 tablet by mouth Daily., Disp: 90 tablet, Rfl: 3  •  atorvastatin (LIPITOR) 40 MG tablet, Take 40 mg by mouth every night at bedtime., Disp: , Rfl:   •  citalopram (CeleXA) 40 MG tablet, Take 40 mg by mouth Daily., Disp: , Rfl:   •  guaiFENesin (MUCINEX) 600 MG 12 hr tablet, Take  "1 tablet by mouth 2 (Two) Times a Day As Needed for Cough or Congestion., Disp: 20 tablet, Rfl: 0  •  losartan (COZAAR) 50 MG tablet, Take 50 mg by mouth 2 (Two) Times a Day., Disp: , Rfl:   •  omeprazole (priLOSEC) 20 MG capsule, Take 20 mg by mouth Daily., Disp: , Rfl:   •  rivaroxaban (Xarelto) 20 MG tablet, Take 1 tablet by mouth Daily With Dinner., Disp: 90 tablet, Rfl: 3  •  traZODone (DESYREL) 50 MG tablet, Take 50 mg by mouth At Night As Needed., Disp: , Rfl:   •  metoprolol tartrate (LOPRESSOR) 100 MG tablet, Take 1 tablet by mouth 2 (Two) Times a Day., Disp: 180 tablet, Rfl: 3     Medications Discontinued During This Encounter   Medication Reason   • rivaroxaban (Xarelto) 20 MG tablet Duplicate order   • metoprolol tartrate (LOPRESSOR) 50 MG tablet Dose adjustment        Review of Systems   Constitutional: Negative for fatigue.   Respiratory: Negative for cough and shortness of breath.    Cardiovascular: Negative for chest pain, palpitations and leg swelling.   Neurological: Negative for dizziness.   All other systems reviewed and are negative.       Objective     Physical Exam  Constitutional:       General: She is not in acute distress.     Appearance: She is obese.      Comments: Smells strongly of smoke   Neck:      Vascular: No carotid bruit.   Cardiovascular:      Rate and Rhythm: Normal rate. Rhythm irregular.      Heart sounds: Murmur (Faint at the apex) heard.   Pulmonary:      Effort: Pulmonary effort is normal.      Breath sounds: Normal breath sounds.   Musculoskeletal:      Right lower leg: No edema.      Left lower leg: No edema.   Neurological:      Mental Status: She is alert.       BP (!) 141/107   Pulse 78   Ht 157.5 cm (62\")   Wt 84.8 kg (187 lb)   BMI 34.20 kg/m²       Vitals:    06/28/22 1315   BP: (!) 141/107   Pulse: 78       Result Review :         The following data was reviewed by: GALINA Johnson on 06/28/2022:        CMP    CMP 4/15/22   Glucose 91   BUN 14 "   Creatinine 0.82   Sodium 141   Potassium 3.8   Chloride 107   Calcium 9.1   Albumin 4.10   Total Bilirubin 0.3   Alkaline Phosphatase 126 (A)   AST (SGOT) 17   ALT (SGPT) 10   (A) Abnormal value            CBC w/diff    CBC w/Diff 4/15/22   WBC 3.89   RBC 5.07   Hemoglobin 14.7   Hematocrit 47.3 (A)   MCV 93.3   MCH 29.0   MCHC 31.1 (A)   RDW 13.9   Platelets 149   (A) Abnormal value                Lab Results   Component Value Date    TSH 4.740 (H) 04/15/2022      Magnesium   Date Value Ref Range Status   04/15/2022 2.0 1.6 - 2.4 mg/dL Final             ECG 12 Lead    Date/Time: 6/28/2022 1:33 PM  Performed by: Katelyn Burrows APRN  Authorized by: Katelyn Burrows APRN   Comparison: compared with previous ECG from 4/28/2022  Similar to previous ECG  Comparison to previous ECG: Rate is better with this EKG  Rhythm: atrial fibrillation  Rate: normal  BPM: 71    Clinical impression: abnormal EKG             Assessment and Plan    Diagnoses and all orders for this visit:    1. Essential (primary) hypertension (Primary)  Assessment & Plan:  Needs tighter control.  Increase metoprolol to tartrate 100 mg twice daily.      2. Persistent atrial fibrillation (HCC)  Assessment & Plan:  With rapid ventricular response.  Increase metoprolol to tartrate to 100 mg twice daily.  Continue Xarelto at 20 mg.  In view of her recent onset of atrial fibrillation we will do a stress test to evaluate for possible ischemia.    Orders:  -     metoprolol tartrate (LOPRESSOR) 100 MG tablet; Take 1 tablet by mouth 2 (Two) Times a Day.  Dispense: 180 tablet; Refill: 3  -     Stress Test With Myocardial Perfusion One Day; Future    3. Cigarette nicotine dependence with nicotine-induced disorder  Assessment & Plan:  Continues to smoke but is actively trying to cut down.  Strongly encouraged to pick a stop date.  Declined any aids      Other orders  -     ECG 12 Lead          Follow Up     Return in about 4 months (around 10/28/2022) for  with Dr. De La Cruz, EKG on next visit.    Patient was given instructions and counseling regarding her condition or for health maintenance advice. Please see specific information pulled into the AVS if appropriate.       GALINA Oakley  06/28/22 16:40 EDT

## 2022-06-28 ENCOUNTER — OFFICE VISIT (OUTPATIENT)
Dept: CARDIOLOGY | Facility: CLINIC | Age: 62
End: 2022-06-28

## 2022-06-28 VITALS
HEIGHT: 62 IN | WEIGHT: 187 LBS | BODY MASS INDEX: 34.41 KG/M2 | HEART RATE: 78 BPM | SYSTOLIC BLOOD PRESSURE: 141 MMHG | DIASTOLIC BLOOD PRESSURE: 107 MMHG

## 2022-06-28 DIAGNOSIS — F17.219 CIGARETTE NICOTINE DEPENDENCE WITH NICOTINE-INDUCED DISORDER: ICD-10-CM

## 2022-06-28 DIAGNOSIS — I10 ESSENTIAL (PRIMARY) HYPERTENSION: Primary | Chronic | ICD-10-CM

## 2022-06-28 DIAGNOSIS — I48.19 PERSISTENT ATRIAL FIBRILLATION: Chronic | ICD-10-CM

## 2022-06-28 PROCEDURE — 93000 ELECTROCARDIOGRAM COMPLETE: CPT | Performed by: NURSE PRACTITIONER

## 2022-06-28 PROCEDURE — 99214 OFFICE O/P EST MOD 30 MIN: CPT | Performed by: NURSE PRACTITIONER

## 2022-06-28 RX ORDER — METOPROLOL TARTRATE 100 MG/1
100 TABLET ORAL 2 TIMES DAILY
Qty: 180 TABLET | Refills: 3 | Status: SHIPPED | OUTPATIENT
Start: 2022-06-28 | End: 2023-02-22 | Stop reason: SDUPTHER

## 2022-06-28 NOTE — ASSESSMENT & PLAN NOTE
Continues to smoke but is actively trying to cut down.  Strongly encouraged to pick a stop date.  Declined any aids

## 2022-06-28 NOTE — ASSESSMENT & PLAN NOTE
With rapid ventricular response.  Increase metoprolol to tartrate to 100 mg twice daily.  Continue Xarelto at 20 mg.  In view of her recent onset of atrial fibrillation we will do a stress test to evaluate for possible ischemia.

## 2022-07-20 ENCOUNTER — HOSPITAL ENCOUNTER (OUTPATIENT)
Dept: NUCLEAR MEDICINE | Facility: HOSPITAL | Age: 62
Discharge: HOME | End: 2022-07-20

## 2022-07-20 ENCOUNTER — LAB (OUTPATIENT)
Dept: LAB | Facility: HOSPITAL | Age: 62
End: 2022-07-20

## 2022-07-20 DIAGNOSIS — E78.5 HYPERLIPIDEMIA, UNSPECIFIED HYPERLIPIDEMIA TYPE: Chronic | ICD-10-CM

## 2022-07-20 DIAGNOSIS — I48.19 PERSISTENT ATRIAL FIBRILLATION: ICD-10-CM

## 2022-07-20 LAB
CHOLEST SERPL-MCNC: 242 MG/DL (ref 0–200)
HDLC SERPL-MCNC: 61 MG/DL (ref 40–60)
LDLC SERPL CALC-MCNC: 169 MG/DL (ref 0–100)
LDLC/HDLC SERPL: 2.73 {RATIO}
TRIGL SERPL-MCNC: 73 MG/DL (ref 0–150)
VLDLC SERPL-MCNC: 12 MG/DL (ref 5–40)

## 2022-07-20 PROCEDURE — 80061 LIPID PANEL: CPT

## 2022-07-20 PROCEDURE — 93017 CV STRESS TEST TRACING ONLY: CPT

## 2022-07-20 PROCEDURE — 93018 CV STRESS TEST I&R ONLY: CPT | Performed by: INTERNAL MEDICINE

## 2022-07-20 PROCEDURE — 0 TECHNETIUM TETROFOSMIN KIT: Performed by: NURSE PRACTITIONER

## 2022-07-20 PROCEDURE — 78452 HT MUSCLE IMAGE SPECT MULT: CPT

## 2022-07-20 PROCEDURE — A9502 TC99M TETROFOSMIN: HCPCS | Performed by: NURSE PRACTITIONER

## 2022-07-20 PROCEDURE — 78452 HT MUSCLE IMAGE SPECT MULT: CPT | Performed by: INTERNAL MEDICINE

## 2022-07-20 PROCEDURE — 36415 COLL VENOUS BLD VENIPUNCTURE: CPT

## 2022-07-20 PROCEDURE — 25010000002 REGADENOSON 0.4 MG/5ML SOLUTION: Performed by: NURSE PRACTITIONER

## 2022-07-20 RX ADMIN — TETROFOSMIN 1 DOSE: 1.38 INJECTION, POWDER, LYOPHILIZED, FOR SOLUTION INTRAVENOUS at 11:11

## 2022-07-20 RX ADMIN — REGADENOSON 0.4 MG: 0.08 INJECTION, SOLUTION INTRAVENOUS at 12:28

## 2022-07-20 RX ADMIN — TETROFOSMIN 1 DOSE: 1.38 INJECTION, POWDER, LYOPHILIZED, FOR SOLUTION INTRAVENOUS at 12:28

## 2022-07-21 LAB
BH CV IMMEDIATE POST TECH DATA BLOOD PRESSURE: NORMAL MMHG
BH CV IMMEDIATE POST TECH DATA HEART RATE: 138 BPM
BH CV IMMEDIATE POST TECH DATA OXYGEN SATS: 96 %
BH CV REST NUCLEAR ISOTOPE DOSE: 9 MCI
BH CV SIX MINUTE RECOVERY TECH DATA BLOOD PRESSURE: NORMAL
BH CV SIX MINUTE RECOVERY TECH DATA HEART RATE: 119 BPM
BH CV SIX MINUTE RECOVERY TECH DATA OXYGEN SATURATION: 93 %
BH CV STRESS BP STAGE 1: NORMAL
BH CV STRESS COMMENTS STAGE 1: NORMAL
BH CV STRESS DOSE REGADENOSON STAGE 1: 0.4
BH CV STRESS DURATION MIN STAGE 1: 0
BH CV STRESS DURATION SEC STAGE 1: 10
BH CV STRESS HR STAGE 1: 150
BH CV STRESS NUCLEAR ISOTOPE DOSE: 32.1 MCI
BH CV STRESS O2 STAGE 1: 93
BH CV STRESS PROTOCOL 1: NORMAL
BH CV STRESS RECOVERY BP: NORMAL MMHG
BH CV STRESS RECOVERY HR: 119 BPM
BH CV STRESS RECOVERY O2: 93 %
BH CV STRESS STAGE 1: 1
BH CV THREE MINUTE POST TECH DATA BLOOD PRESSURE: NORMAL MMHG
BH CV THREE MINUTE POST TECH DATA HEART RATE: 132 BPM
BH CV THREE MINUTE POST TECH DATA OXYGEN SATURATION: 95 %
LV EF NUC BP: 53 %
MAXIMAL PREDICTED HEART RATE: 159 BPM
PERCENT MAX PREDICTED HR: 94.34 %
STRESS BASELINE BP: NORMAL MMHG
STRESS BASELINE HR: 99 BPM
STRESS O2 SAT REST: 93 %
STRESS PERCENT HR: 111 %
STRESS POST O2 SAT PEAK: 96 %
STRESS POST PEAK BP: NORMAL MMHG
STRESS POST PEAK HR: 150 BPM
STRESS TARGET HR: 135 BPM

## 2022-07-22 ENCOUNTER — TELEPHONE (OUTPATIENT)
Dept: CARDIOLOGY | Facility: CLINIC | Age: 62
End: 2022-07-22

## 2022-07-22 DIAGNOSIS — E78.5 HYPERLIPIDEMIA, UNSPECIFIED HYPERLIPIDEMIA TYPE: Primary | ICD-10-CM

## 2022-07-22 NOTE — TELEPHONE ENCOUNTER
----- Message from GALINA Johnson sent at 7/21/2022  7:22 AM EDT -----  Cholesterols are very abnormal.  Is she taking her atorvastatin daily?

## 2022-07-25 RX ORDER — ATORVASTATIN CALCIUM 40 MG/1
40 TABLET, FILM COATED ORAL
Qty: 90 TABLET | Refills: 3 | Status: SHIPPED | OUTPATIENT
Start: 2022-07-25 | End: 2023-02-22 | Stop reason: SDUPTHER

## 2022-07-25 NOTE — TELEPHONE ENCOUNTER
"I called pt back and she said she is taking the Atovastatin afterall but has missed \"a couple doses here and there.\"  "

## 2022-07-25 NOTE — TELEPHONE ENCOUNTER
Pt states she is not taking Atorvastatin- does not remember it being prescribed for her. I did see it on her list though.     Gave normal stress test results.

## 2022-07-25 NOTE — TELEPHONE ENCOUNTER
Since patient states she is not taking the atorvastatin and it is on her list.  We are going to restart atorvastatin 40 mg and check labs in 3 months

## 2022-07-25 NOTE — TELEPHONE ENCOUNTER
Cholesterols are pretty high however when she was taking atorvastatin regularly they were controlled.  Suggest repeat labs in 3 months and if not improving will increase atorvastatin.

## 2022-07-29 ENCOUNTER — TELEPHONE (OUTPATIENT)
Dept: CARDIOLOGY | Facility: CLINIC | Age: 62
End: 2022-07-29

## 2022-07-29 NOTE — TELEPHONE ENCOUNTER
----- Message from GALINA Johnson sent at 7/21/2022 10:45 AM EDT -----  Stress test was normal.  No further testing needs to be done.

## 2022-11-15 PROCEDURE — U0004 COV-19 TEST NON-CDC HGH THRU: HCPCS | Performed by: PHYSICIAN ASSISTANT

## 2023-02-13 DIAGNOSIS — E78.5 HYPERLIPIDEMIA, UNSPECIFIED HYPERLIPIDEMIA TYPE: ICD-10-CM

## 2023-02-13 DIAGNOSIS — I48.19 PERSISTENT ATRIAL FIBRILLATION: Chronic | ICD-10-CM

## 2023-02-13 DIAGNOSIS — I10 ESSENTIAL (PRIMARY) HYPERTENSION: Chronic | ICD-10-CM

## 2023-02-13 RX ORDER — AMLODIPINE BESYLATE 5 MG/1
5 TABLET ORAL DAILY
Qty: 90 TABLET | Refills: 3 | OUTPATIENT
Start: 2023-02-13

## 2023-02-13 RX ORDER — ATORVASTATIN CALCIUM 40 MG/1
40 TABLET, FILM COATED ORAL
Qty: 90 TABLET | Refills: 3 | OUTPATIENT
Start: 2023-02-13

## 2023-02-13 RX ORDER — METOPROLOL TARTRATE 100 MG/1
100 TABLET ORAL 2 TIMES DAILY
Qty: 180 TABLET | Refills: 3 | OUTPATIENT
Start: 2023-02-13

## 2023-02-13 NOTE — TELEPHONE ENCOUNTER
Caller: Daily Cartagena    Relationship: Self    Best call back number: [phone]    Requested Prescriptions:   Requested Prescriptions     Pending Prescriptions Disp Refills   • rivaroxaban (Xarelto) 20 MG tablet 90 tablet 3     Sig: Take 1 tablet by mouth Daily With Dinner.   • amLODIPine (NORVASC) 5 MG tablet 90 tablet 3     Sig: Take 1 tablet by mouth Daily.   • metoprolol tartrate (LOPRESSOR) 100 MG tablet 180 tablet 3     Sig: Take 1 tablet by mouth 2 (Two) Times a Day.   • atorvastatin (LIPITOR) 40 MG tablet 90 tablet 3     Sig: Take 1 tablet by mouth every night at bedtime.      Pharmacy where request should be sent: [facility] [address] [place] [address] [facility]ON [facility] [phone] [facility] [phone] FX     Does the patient have less than a 3 day supply:  [x] Yes  [] No    Would you like a call back once the refill request has been completed: [x] Yes [] No    If the office needs to give you a call back, can they leave a voicemail: [x] Yes [] No    Celso Rodriguez Rep   02/13/23 08:22 EST

## 2023-02-14 RX ORDER — CITALOPRAM 40 MG/1
40 TABLET ORAL DAILY
Qty: 30 TABLET | Refills: 0 | OUTPATIENT
Start: 2023-02-14

## 2023-02-14 RX ORDER — TRAZODONE HYDROCHLORIDE 50 MG/1
50 TABLET ORAL NIGHTLY PRN
Qty: 30 TABLET | Refills: 0 | Status: SHIPPED | OUTPATIENT
Start: 2023-02-14 | End: 2023-02-22 | Stop reason: SDUPTHER

## 2023-02-14 RX ORDER — LOSARTAN POTASSIUM 50 MG/1
50 TABLET ORAL 2 TIMES DAILY
Qty: 60 TABLET | Refills: 0 | Status: SHIPPED | OUTPATIENT
Start: 2023-02-14 | End: 2023-02-22 | Stop reason: SDUPTHER

## 2023-02-14 RX ORDER — OMEPRAZOLE 20 MG/1
20 CAPSULE, DELAYED RELEASE ORAL DAILY
Qty: 30 CAPSULE | Refills: 0 | Status: SHIPPED | OUTPATIENT
Start: 2023-02-14

## 2023-02-22 ENCOUNTER — OFFICE VISIT (OUTPATIENT)
Dept: FAMILY MEDICINE CLINIC | Age: 63
End: 2023-02-22
Payer: COMMERCIAL

## 2023-02-22 VITALS
BODY MASS INDEX: 36.44 KG/M2 | HEART RATE: 108 BPM | HEIGHT: 62 IN | DIASTOLIC BLOOD PRESSURE: 104 MMHG | TEMPERATURE: 98.4 F | OXYGEN SATURATION: 98 % | WEIGHT: 198 LBS | SYSTOLIC BLOOD PRESSURE: 152 MMHG

## 2023-02-22 DIAGNOSIS — E78.5 HYPERLIPIDEMIA, UNSPECIFIED HYPERLIPIDEMIA TYPE: ICD-10-CM

## 2023-02-22 DIAGNOSIS — Z00.00 ROUTINE GENERAL MEDICAL EXAMINATION AT A HEALTH CARE FACILITY: Primary | ICD-10-CM

## 2023-02-22 DIAGNOSIS — Z13.6 SCREENING FOR CARDIOVASCULAR CONDITION: ICD-10-CM

## 2023-02-22 DIAGNOSIS — R00.0 TACHYCARDIA: ICD-10-CM

## 2023-02-22 DIAGNOSIS — R06.2 WHEEZING: ICD-10-CM

## 2023-02-22 DIAGNOSIS — I48.19 PERSISTENT ATRIAL FIBRILLATION: Chronic | ICD-10-CM

## 2023-02-22 DIAGNOSIS — Z12.31 ENCOUNTER FOR SCREENING MAMMOGRAM FOR MALIGNANT NEOPLASM OF BREAST: ICD-10-CM

## 2023-02-22 DIAGNOSIS — Z12.11 SCREENING FOR COLON CANCER: ICD-10-CM

## 2023-02-22 DIAGNOSIS — G47.00 INSOMNIA, UNSPECIFIED TYPE: ICD-10-CM

## 2023-02-22 DIAGNOSIS — M72.2 PLANTAR FASCIITIS, BILATERAL: ICD-10-CM

## 2023-02-22 DIAGNOSIS — F17.219 CIGARETTE NICOTINE DEPENDENCE WITH NICOTINE-INDUCED DISORDER: ICD-10-CM

## 2023-02-22 DIAGNOSIS — Z11.59 SCREENING FOR VIRAL DISEASE: ICD-10-CM

## 2023-02-22 DIAGNOSIS — I10 ESSENTIAL (PRIMARY) HYPERTENSION: Chronic | ICD-10-CM

## 2023-02-22 DIAGNOSIS — F41.9 ANXIETY: ICD-10-CM

## 2023-02-22 PROCEDURE — 99214 OFFICE O/P EST MOD 30 MIN: CPT | Performed by: NURSE PRACTITIONER

## 2023-02-22 PROCEDURE — 99396 PREV VISIT EST AGE 40-64: CPT | Performed by: NURSE PRACTITIONER

## 2023-02-22 RX ORDER — DICLOFENAC SODIUM 75 MG/1
75 TABLET, DELAYED RELEASE ORAL 2 TIMES DAILY PRN
Qty: 60 TABLET | Refills: 0 | Status: SHIPPED | OUTPATIENT
Start: 2023-02-22

## 2023-02-22 RX ORDER — ATORVASTATIN CALCIUM 40 MG/1
40 TABLET, FILM COATED ORAL
Qty: 90 TABLET | Refills: 1 | Status: SHIPPED | OUTPATIENT
Start: 2023-02-22

## 2023-02-22 RX ORDER — METOPROLOL TARTRATE 100 MG/1
100 TABLET ORAL 2 TIMES DAILY
Qty: 180 TABLET | Refills: 0 | Status: SHIPPED | OUTPATIENT
Start: 2023-02-22

## 2023-02-22 RX ORDER — LOSARTAN POTASSIUM 50 MG/1
50 TABLET ORAL 2 TIMES DAILY
Qty: 180 TABLET | Refills: 1 | Status: SHIPPED | OUTPATIENT
Start: 2023-02-22

## 2023-02-22 RX ORDER — TRAZODONE HYDROCHLORIDE 50 MG/1
50 TABLET ORAL NIGHTLY PRN
Qty: 90 TABLET | Refills: 1 | Status: SHIPPED | OUTPATIENT
Start: 2023-02-22

## 2023-02-22 RX ORDER — ALBUTEROL SULFATE 90 UG/1
2 AEROSOL, METERED RESPIRATORY (INHALATION) EVERY 4 HOURS PRN
Qty: 18 G | Refills: 0 | Status: SHIPPED | OUTPATIENT
Start: 2023-02-22

## 2023-02-22 RX ORDER — CITALOPRAM 40 MG/1
40 TABLET ORAL DAILY
Qty: 90 TABLET | Refills: 1 | Status: SHIPPED | OUTPATIENT
Start: 2023-02-22

## 2023-02-22 RX ORDER — AMLODIPINE BESYLATE 5 MG/1
5 TABLET ORAL DAILY
Qty: 90 TABLET | Refills: 1 | Status: SHIPPED | OUTPATIENT
Start: 2023-02-22 | End: 2023-03-16

## 2023-02-22 NOTE — PROGRESS NOTES
"Chief Complaint  Daily Cartagena presents to [facility] for Annual Exam (CC: HTN, hyperlipidemia, A- fib. /Medication refills )      Subjective     History of Present Illness  Daily is here today for annual exam.   Last annual exam was unsure  Last eye exam: 1 month  PROVIDER: Yelena Asencio  Last dental exam:   n/a    PROVIDER:  dentures  Last menstrual period:  Post menopause ago.  Diet / exercise:   No special diet or specific exercise program  Patient's Body mass index is 36.21 kg/m². indicating that she is obese (BMI >30).  Satisfied with weight?  no    Patient Care Team:  Clarissa Vasques APRN as PCP - General (Nurse Practitioner)  Jason De La Cruz MD as Consulting Physician (Cardiology)     Daily presents today for follow up on Hypertension.    Current medication / treatment includes Losartan, Metoprolol and amlodipine.    Reported as BP checks at home: is \"ok\" but has been off medication for a few days  Daily presents today for follow up on hyperlipidemia.  Previous values: Lab Results       Component                Value               Date                       CHOL                     242 (H)             07/20/2022                 CHLPL                    172                 04/12/2021                 TRIG                     73                  07/20/2022                 HDL                      61 (H)              07/20/2022                 LDL                      169 (H)             07/20/2022           ;    Current CVD 10yr risk is The 10-year ASCVD risk score (Kristen SHEN, et al., 2019) is: 15.8%    Values used to calculate the score:      Age: 62 years      Sex: Female      Is Non- : No      Diabetic: No      Tobacco smoker: Yes      Systolic Blood Pressure: 152 mmHg      Is BP treated: Yes      HDL Cholesterol: 61 mg/dL      Total Cholesterol: 242 mg/dL ;    Daily reports compliant with medication which is atorvastatin (lipitor)    No " side effects reported from this medication .   No new concerns to discuss today.    She does have a history of Afib and is followed by Dr. De La Cruz  Taking Metoprolol and Eliquis She does not have an appt scheduled with him at this time and has been out of her medication for a few days.        Assessment and Plan       Diagnoses and all orders for this visit:    1. Routine general medical examination at a health care facility (Primary)  Comments:  diet and exercise, annual wellness, health maintenance recommendations discussed- declines vaccines as noted and pap- other screenings recommended     2. Plantar fasciitis, bilateral  Comments:  instructed to change shoes/ or insoles- may consider follow up with podiatry if persists- NSAIDs for short period of time  Orders:  -     diclofenac (VOLTAREN) 75 MG EC tablet; Take 1 tablet by mouth 2 (Two) Times a Day As Needed (pain).  Dispense: 60 tablet; Refill: 0    3. Persistent atrial fibrillation (HCC)  Comments:  I will provide refills for 90 days to allow time to follow up with cardiology but will need to follow up with them for ongoing management  Orders:  -     metoprolol tartrate (LOPRESSOR) 100 MG tablet; Take 1 tablet by mouth 2 (Two) Times a Day. No further refills from our office , will need to follow up with cardiology for management  Dispense: 180 tablet; Refill: 0  -     rivaroxaban (Xarelto) 20 MG tablet; Take 1 tablet by mouth Daily With Dinner. Will need to follow up with cardiology for further refills / management  Dispense: 90 tablet; Refill: 0    4. Essential (primary) hypertension  Comments:  resume medication as recommended- will consider additional tx if persists once back on medication due to risk of CVD as discussed   Orders:  -     losartan (COZAAR) 50 MG tablet; Take 1 tablet by mouth 2 (Two) Times a Day.  Dispense: 180 tablet; Refill: 1  -     amLODIPine (NORVASC) 5 MG tablet; Take 1 tablet by mouth Daily.  Dispense: 90 tablet; Refill: 1    5.  Hyperlipidemia, unspecified hyperlipidemia type  Comments:  medication as prescribed, need to repeat labs to see if need to increase mediation due to increase CVD risk   Orders:  -     atorvastatin (LIPITOR) 40 MG tablet; Take 1 tablet by mouth every night at bedtime.  Dispense: 90 tablet; Refill: 1    6. Anxiety  Comments:  continue citalopram as prescribed - follow up 6 months  Orders:  -     citalopram (CeleXA) 40 MG tablet; Take 1 tablet by mouth Daily.  Dispense: 90 tablet; Refill: 1    7. Insomnia, unspecified type  Comments:  continue trazodone as prescribed    Orders:  -     traZODone (DESYREL) 50 MG tablet; Take 1 tablet by mouth At Night As Needed for Sleep.  Dispense: 90 tablet; Refill: 1    8. Wheezing  Comments:  will provide PRN use inhaler, may consider COPD work up if persists/ worsens - suspect COPD given smoking history  Orders:  -     albuterol sulfate  (90 Base) MCG/ACT inhaler; Inhale 2 puffs Every 4 (Four) Hours As Needed for Wheezing.  Dispense: 18 g; Refill: 0    9. Cigarette nicotine dependence with nicotine-induced disorder  -      CT Chest Low Dose Cancer Screening WO; Future  -     albuterol sulfate  (90 Base) MCG/ACT inhaler; Inhale 2 puffs Every 4 (Four) Hours As Needed for Wheezing.  Dispense: 18 g; Refill: 0    10. Encounter for screening mammogram for malignant neoplasm of breast  -     Cancel: Mammo Screening Digital Tomosynthesis Bilateral With CAD; Future  -     Mammo Screening Digital Tomosynthesis Bilateral With CAD; Future    11. Screening for cardiovascular condition  -     Lipid panel; Future  -     Comprehensive metabolic panel; Future    12. Screening for viral disease  -     Hepatitis C antibody; Future    13. Screening for colon cancer  -     Cologuard - Stool, Per Rectum; Future    14. Tachycardia  Comments:  suspect due to afib off medication- advised to follow up with cardiology as discussed         Follow Up   Return in about 6 months (around 8/22/2023)  for Recheck - sooner if BP remains elevated once back on Rx.      New Medications Ordered This Visit   Medications   • diclofenac (VOLTAREN) 75 MG EC tablet     Sig: Take 1 tablet by mouth 2 (Two) Times a Day As Needed (pain).     Dispense:  60 tablet     Refill:  0   • citalopram (CeleXA) 40 MG tablet     Sig: Take 1 tablet by mouth Daily.     Dispense:  90 tablet     Refill:  1   • metoprolol tartrate (LOPRESSOR) 100 MG tablet     Sig: Take 1 tablet by mouth 2 (Two) Times a Day. No further refills from our office , will need to follow up with cardiology for management     Dispense:  180 tablet     Refill:  0   • rivaroxaban (Xarelto) 20 MG tablet     Sig: Take 1 tablet by mouth Daily With Dinner. Will need to follow up with cardiology for further refills / management     Dispense:  90 tablet     Refill:  0   • traZODone (DESYREL) 50 MG tablet     Sig: Take 1 tablet by mouth At Night As Needed for Sleep.     Dispense:  90 tablet     Refill:  1   • losartan (COZAAR) 50 MG tablet     Sig: Take 1 tablet by mouth 2 (Two) Times a Day.     Dispense:  180 tablet     Refill:  1   • amLODIPine (NORVASC) 5 MG tablet     Sig: Take 1 tablet by mouth Daily.     Dispense:  90 tablet     Refill:  1   • atorvastatin (LIPITOR) 40 MG tablet     Sig: Take 1 tablet by mouth every night at bedtime.     Dispense:  90 tablet     Refill:  1   • albuterol sulfate  (90 Base) MCG/ACT inhaler     Sig: Inhale 2 puffs Every 4 (Four) Hours As Needed for Wheezing.     Dispense:  18 g     Refill:  0       Medications Discontinued During This Encounter   Medication Reason   • ondansetron ODT (ZOFRAN-ODT) 4 MG disintegrating tablet *Therapy completed   • benzonatate (TESSALON) 100 MG capsule *Therapy completed   • guaiFENesin (MUCINEX) 600 MG 12 hr tablet *Therapy completed   • citalopram (CeleXA) 40 MG tablet Reorder   • rivaroxaban (Xarelto) 20 MG tablet Reorder   • amLODIPine (NORVASC) 5 MG tablet Reorder   • metoprolol tartrate  "(LOPRESSOR) 100 MG tablet Reorder   • atorvastatin (LIPITOR) 40 MG tablet Reorder   • losartan (COZAAR) 50 MG tablet Reorder   • traZODone (DESYREL) 50 MG tablet Reorder            Review of Systems    Objective     Vitals:    02/22/23 1245   BP: (!) 152/104   BP Location: Left arm   Patient Position: Sitting   Cuff Size: Adult   Pulse: 108   Temp: 98.4 °F (36.9 °C)   TempSrc: Oral   SpO2: 98%   Weight: 89.8 kg (198 lb)   Height: 157.5 cm (62\")     Body mass index is 36.21 kg/m².     Physical Exam  Vitals reviewed.   Constitutional:       Appearance: Normal appearance. She is well-developed. She is not ill-appearing.   HENT:      Head: Normocephalic and atraumatic.      Right Ear: Tympanic membrane, ear canal and external ear normal.      Left Ear: Tympanic membrane, ear canal and external ear normal.      Mouth/Throat:      Lips: Pink.      Mouth: Mucous membranes are moist.      Pharynx: Oropharynx is clear. Uvula midline. No oropharyngeal exudate.   Eyes:      Extraocular Movements: Extraocular movements intact.      Conjunctiva/sclera: Conjunctivae normal.      Pupils: Pupils are equal, round, and reactive to light.   Neck:      Thyroid: No thyromegaly.   Cardiovascular:      Rate and Rhythm: Tachycardia present. Rhythm irregularly irregular.      Heart sounds: No murmur heard.    No friction rub. No gallop.   Pulmonary:      Effort: Pulmonary effort is normal.      Breath sounds: Examination of the right-lower field reveals wheezing. Examination of the left-lower field reveals wheezing. Wheezing present. No rhonchi.   Abdominal:      General: Bowel sounds are normal. There is no distension.      Palpations: Abdomen is soft.      Tenderness: There is no abdominal tenderness.   Musculoskeletal:      Cervical back: Normal range of motion.   Lymphadenopathy:      Head:      Right side of head: No submandibular adenopathy.      Left side of head: No submandibular adenopathy.      Cervical: No cervical adenopathy. "   Skin:     General: Skin is warm and dry.      Findings: No lesion or rash.   Neurological:      Mental Status: She is alert and oriented to person, place, and time.      Cranial Nerves: No cranial nerve deficit.      Gait: Gait is intact.   Psychiatric:         Mood and Affect: Mood and affect normal.         Behavior: Behavior normal.         Thought Content: Thought content normal.         Judgment: Judgment normal.            Result Review                       No Known Allergies   Past Medical History:   Diagnosis Date   • Acute upper respiratory infection, unspecified    • Anxiety disorder, unspecified    • Dorsalgia, unspecified    • Essential (primary) hypertension    • Heartburn    • HLD (hyperlipidemia)     unspecified   • Insomnia, unspecified    • Localized edema    • Nicotine dependence, unspecified, uncomplicated      Current Outpatient Medications   Medication Sig Dispense Refill   • amLODIPine (NORVASC) 5 MG tablet Take 1 tablet by mouth Daily. 90 tablet 1   • atorvastatin (LIPITOR) 40 MG tablet Take 1 tablet by mouth every night at bedtime. 90 tablet 1   • citalopram (CeleXA) 40 MG tablet Take 1 tablet by mouth Daily. 90 tablet 1   • losartan (COZAAR) 50 MG tablet Take 1 tablet by mouth 2 (Two) Times a Day. 180 tablet 1   • metoprolol tartrate (LOPRESSOR) 100 MG tablet Take 1 tablet by mouth 2 (Two) Times a Day. No further refills from our office , will need to follow up with cardiology for management 180 tablet 0   • omeprazole (priLOSEC) 20 MG capsule Take 1 capsule by mouth Daily. 30 capsule 0   • rivaroxaban (Xarelto) 20 MG tablet Take 1 tablet by mouth Daily With Dinner. Will need to follow up with cardiology for further refills / management 90 tablet 0   • traZODone (DESYREL) 50 MG tablet Take 1 tablet by mouth At Night As Needed for Sleep. 90 tablet 1   • albuterol sulfate  (90 Base) MCG/ACT inhaler Inhale 2 puffs Every 4 (Four) Hours As Needed for Wheezing. 18 g 0   • diclofenac  (VOLTAREN) 75 MG EC tablet Take 1 tablet by mouth 2 (Two) Times a Day As Needed (pain). 60 tablet 0     No current facility-administered medications for this visit.     Past Surgical History:   Procedure Laterality Date   • ENDOSCOPY  1999   • EXERCISE STRESS - CONVERTED  2014    treadmill stress test; NEG   • LAMINECTOMY      lumbar region      Health Maintenance Due   Topic Date Due   • MAMMOGRAM  Never done   • COLORECTAL CANCER SCREENING  Never done   • HEPATITIS C SCREENING  Never done      Immunization History   Administered Date(s) Administered   • COVID-19 (PFIZER) PURPLE CAP 07/26/2021, 08/16/2021

## 2023-03-06 ENCOUNTER — TELEPHONE (OUTPATIENT)
Dept: FAMILY MEDICINE CLINIC | Age: 63
End: 2023-03-06
Payer: COMMERCIAL

## 2023-03-13 NOTE — TELEPHONE ENCOUNTER
2nd attempt-  Left vm in regards to pending mammo and pending labs ordered 2/22/23 Letter mailed./mw

## 2023-03-16 ENCOUNTER — LAB (OUTPATIENT)
Dept: LAB | Facility: HOSPITAL | Age: 63
End: 2023-03-16
Payer: COMMERCIAL

## 2023-03-16 ENCOUNTER — OFFICE VISIT (OUTPATIENT)
Dept: CARDIOLOGY | Facility: CLINIC | Age: 63
End: 2023-03-16
Payer: COMMERCIAL

## 2023-03-16 VITALS
HEART RATE: 64 BPM | DIASTOLIC BLOOD PRESSURE: 86 MMHG | HEIGHT: 62 IN | SYSTOLIC BLOOD PRESSURE: 147 MMHG | WEIGHT: 199.3 LBS | BODY MASS INDEX: 36.67 KG/M2

## 2023-03-16 DIAGNOSIS — I10 ESSENTIAL (PRIMARY) HYPERTENSION: Primary | ICD-10-CM

## 2023-03-16 DIAGNOSIS — Z13.6 SCREENING FOR CARDIOVASCULAR CONDITION: ICD-10-CM

## 2023-03-16 DIAGNOSIS — F17.219 CIGARETTE NICOTINE DEPENDENCE WITH NICOTINE-INDUCED DISORDER: ICD-10-CM

## 2023-03-16 DIAGNOSIS — I48.19 PERSISTENT ATRIAL FIBRILLATION: Chronic | ICD-10-CM

## 2023-03-16 DIAGNOSIS — E78.5 HYPERLIPIDEMIA, UNSPECIFIED HYPERLIPIDEMIA TYPE: ICD-10-CM

## 2023-03-16 DIAGNOSIS — Z11.59 SCREENING FOR VIRAL DISEASE: ICD-10-CM

## 2023-03-16 LAB
ALBUMIN SERPL-MCNC: 4.1 G/DL (ref 3.5–5.2)
ALBUMIN/GLOB SERPL: 1.8 G/DL
ALP SERPL-CCNC: 131 U/L (ref 39–117)
ALT SERPL W P-5'-P-CCNC: 20 U/L (ref 1–33)
ANION GAP SERPL CALCULATED.3IONS-SCNC: 7.1 MMOL/L (ref 5–15)
AST SERPL-CCNC: 18 U/L (ref 1–32)
BILIRUB SERPL-MCNC: 0.4 MG/DL (ref 0–1.2)
BUN SERPL-MCNC: 12 MG/DL (ref 8–23)
BUN/CREAT SERPL: 14.8 (ref 7–25)
CALCIUM SPEC-SCNC: 9.1 MG/DL (ref 8.6–10.5)
CHLORIDE SERPL-SCNC: 106 MMOL/L (ref 98–107)
CHOLEST SERPL-MCNC: 159 MG/DL (ref 0–200)
CO2 SERPL-SCNC: 27.9 MMOL/L (ref 22–29)
CREAT SERPL-MCNC: 0.81 MG/DL (ref 0.57–1)
EGFRCR SERPLBLD CKD-EPI 2021: 82.2 ML/MIN/1.73
GLOBULIN UR ELPH-MCNC: 2.3 GM/DL
GLUCOSE SERPL-MCNC: 109 MG/DL (ref 65–99)
HCV AB SER DONR QL: NORMAL
HDLC SERPL-MCNC: 48 MG/DL (ref 40–60)
LDLC SERPL CALC-MCNC: 96 MG/DL (ref 0–100)
LDLC/HDLC SERPL: 1.99 {RATIO}
POTASSIUM SERPL-SCNC: 4.5 MMOL/L (ref 3.5–5.2)
PROT SERPL-MCNC: 6.4 G/DL (ref 6–8.5)
SODIUM SERPL-SCNC: 141 MMOL/L (ref 136–145)
TRIGL SERPL-MCNC: 77 MG/DL (ref 0–150)
VLDLC SERPL-MCNC: 15 MG/DL (ref 5–40)

## 2023-03-16 PROCEDURE — 86803 HEPATITIS C AB TEST: CPT

## 2023-03-16 PROCEDURE — 99214 OFFICE O/P EST MOD 30 MIN: CPT | Performed by: FAMILY MEDICINE

## 2023-03-16 PROCEDURE — 80053 COMPREHEN METABOLIC PANEL: CPT

## 2023-03-16 PROCEDURE — 80061 LIPID PANEL: CPT

## 2023-03-16 PROCEDURE — 36415 COLL VENOUS BLD VENIPUNCTURE: CPT

## 2023-03-16 RX ORDER — AMLODIPINE BESYLATE 10 MG/1
10 TABLET ORAL DAILY
Qty: 90 TABLET | Refills: 3 | Status: SHIPPED | OUTPATIENT
Start: 2023-03-16

## 2023-03-16 NOTE — ASSESSMENT & PLAN NOTE
Rates well controlled, continue current dose of metoprolol, and chronic anticoagulation with Xarelto.

## 2023-03-16 NOTE — PROGRESS NOTES
Chief Complaint  Hypertension, Atrial Fibrillation, Hyperlipidemia, and Follow-up    Subjective        History of Present Illness  Daily Cartagena presents to [facility] CARDIOLOGY   Daily Cartagena is a 62-year-old female patient coming in today for routine cardiac follow-up for atrial fibrillation, hypertension and hyperlipidemia.   She reports overall she is doing well, and does not have any concerns at today's visit.  States she very seldom has palpitations.  Blood pressure is elevated in the office today at initially 143/101 and repeat of 147/86.  Report good compliance with her medication, she feels like she has been under a lot of stress with work recently.  Tolerating anticoagulation without any complaints of bleeding issues.      Past history:  1.  Atrial fibrillation- persistent  2.  Hypertension  3.  Hyperlipidemia  4.  Smoker    PMH  Past Medical History:   Diagnosis Date   • Acute upper respiratory infection, unspecified    • Anxiety disorder, unspecified    • Atrial fibrillation (HCC)    • Dorsalgia, unspecified    • Essential (primary) hypertension    • Heartburn    • HLD (hyperlipidemia)     unspecified   • Insomnia, unspecified    • Localized edema    • Nicotine dependence, unspecified, uncomplicated          ALLERGY  No Known Allergies       SURGICALHX  Past Surgical History:   Procedure Laterality Date   • ENDOSCOPY  1999   • EXERCISE STRESS - CONVERTED  2014    treadmill stress test; NEG   • LAMINECTOMY      lumbar region          SOC  Social History     Socioeconomic History   • Marital status:    • Number of children: 2   Tobacco Use   • Smoking status: Every Day     Packs/day: 0.25     Years: 28.00     Pack years: 7.00     Types: Cigarettes     Start date: 1994   • Smokeless tobacco: Never   • Tobacco comments:     1-5 cig per day; .5ppd   Vaping Use   • Vaping Use: Never used   Substance and Sexual Activity   • Alcohol use: Yes     Comment: very seldom, maybe once or  twice a week   • Drug use: Never   • Sexual activity: Defer         FAMHX  Family History   Problem Relation Age of Onset   • Hypertension Other    • Cancer Other         type not specified   • Diabetes type II Other           MEDSIGONLY  Current Outpatient Medications on File Prior to Visit   Medication Sig   • albuterol sulfate  (90 Base) MCG/ACT inhaler Inhale 2 puffs Every 4 (Four) Hours As Needed for Wheezing.   • atorvastatin (LIPITOR) 40 MG tablet Take 1 tablet by mouth every night at bedtime.   • citalopram (CeleXA) 40 MG tablet Take 1 tablet by mouth Daily.   • diclofenac (VOLTAREN) 75 MG EC tablet Take 1 tablet by mouth 2 (Two) Times a Day As Needed (pain).   • losartan (COZAAR) 50 MG tablet Take 1 tablet by mouth 2 (Two) Times a Day.   • metoprolol tartrate (LOPRESSOR) 100 MG tablet Take 1 tablet by mouth 2 (Two) Times a Day. No further refills from our office , will need to follow up with cardiology for management   • omeprazole (priLOSEC) 20 MG capsule Take 1 capsule by mouth Daily.   • traZODone (DESYREL) 50 MG tablet Take 1 tablet by mouth At Night As Needed for Sleep.   • [DISCONTINUED] amLODIPine (NORVASC) 5 MG tablet Take 1 tablet by mouth Daily.   • [DISCONTINUED] rivaroxaban (Xarelto) 20 MG tablet Take 1 tablet by mouth Daily With Dinner. Will need to follow up with cardiology for further refills / management     No current facility-administered medications on file prior to visit.       REVIEW OF SYSTEMS  Review of Systems   Constitutional: Negative for activity change, chills and fatigue.   Respiratory: Negative for cough, chest tightness and shortness of breath.    Cardiovascular: Positive for palpitations (Very rare). Negative for chest pain and leg swelling.   Gastrointestinal: Negative for nausea and vomiting.   Skin: Negative for rash.   Neurological: Negative for dizziness, syncope and light-headedness.   Hematological: Does not bruise/bleed easily.   Psychiatric/Behavioral:  "Positive for stress.        Objective   Vitals:    03/16/23 0956 03/16/23 0957   BP: (!) 143/101 147/86   Pulse: 70 64   Weight: 90.4 kg (199 lb 4.8 oz)    Height: 157.5 cm (62\")          Physical Exam  General : Alert, awake, no acute distress  Neck : Supple, no carotid bruit, no jugular venous distention  CVS : Irregularly irregular rate and rhythm, no murmur, rubs or gallops  Lungs: Clear to auscultation bilaterally, no crackles or rhonchi  Abdomen: Soft, nontender, rotund  Extremities: Warm, well-perfused, no pedal edema      Result Review     The following data was reviewed by GALINA Monique on 03/16/23.    No results found for: PROBNP  CMP    CMP 4/15/22   Glucose 91   BUN 14   Creatinine 0.82   eGFR 81.5   Sodium 141   Potassium 3.8   Chloride 107   Calcium 9.1   Total Protein 6.5   Albumin 4.10   Globulin 2.4   Total Bilirubin 0.3   Alkaline Phosphatase 126 (A)   AST (SGOT) 17   ALT (SGPT) 10   Albumin/Globulin Ratio 1.7   BUN/Creatinine Ratio 17.1   Anion Gap 9.5   (A) Abnormal value       Comments are available for some flowsheets but are not being displayed.           CBC w/diff    CBC w/Diff 4/15/22   WBC 3.89   RBC 5.07   Hemoglobin 14.7   Hematocrit 47.3 (A)   MCV 93.3   MCH 29.0   MCHC 31.1 (A)   RDW 13.9   Platelets 149   (A) Abnormal value             Lab Results   Component Value Date    TSH 4.740 (H) 04/15/2022        Magnesium   Date Value Ref Range Status   04/15/2022 2.0 1.6 - 2.4 mg/dL Final          Lipid Panel    Lipid Panel 7/20/22   Total Cholesterol 242 (A)   Triglycerides 73   HDL Cholesterol 61 (A)   VLDL Cholesterol 12   LDL Cholesterol  169 (A)   LDL/HDL Ratio 2.73   (A) Abnormal value              Results for orders placed in visit on 05/26/22    Adult Transthoracic Echo Complete w/ Color, Spectral and Contrast if necessary per protocol    Interpretation Summary  · Estimated left ventricular EF = 55% Left ventricular systolic function is normal.  · There is diastolic " dysfunction of the left ventricle.  · There is mild to moderate biatrial enlargement.  · There is mild mitral regurgitation.  · There is mild tricuspid regurgitation. Estimated right ventricular systolic pressure from tricuspid regurgitation is mildly elevated (35-45 mmHg).  · Patient was noted to be in atrial fibrillation with controlled ventricular rates at the time of examination.    Results for orders placed during the hospital encounter of 07/20/22    Stress Test With Myocardial Perfusion One Day    Interpretation Summary  · Myocardial perfusion imaging indicates a normal myocardial perfusion study with no evidence of ischemia.  · Left ventricular ejection fraction is normal. (Calculated EF = 53%).  · Baseline EKG showed atrial fibrillation with controlled ventricular rate. Patient developed rapid ventricular rate during recovery.  · There was no chest pain with regadenoson infusion.  · There were no ischemic EKG changes with regadenoson infusion.  · Impressions are consistent with a low risk study.           Assessment and Plan   Diagnoses and all orders for this visit:    1. Essential (primary) hypertension (Primary)  Assessment & Plan:  Not well controlled.  Continue losartan, and metoprolol at current doses, we will increase amlodipine from 5 mg to 10 mg daily.  Recommend home monitoring of BP, and if still not controlled for patient to let us know we can make further adjustments as needed.      2. Persistent atrial fibrillation (HCC)  Assessment & Plan:  Rates well controlled, continue current dose of metoprolol, and chronic anticoagulation with Xarelto.    Orders:  -     rivaroxaban (Xarelto) 20 MG tablet; Take 1 tablet by mouth Daily With Dinner. Will need to follow up with cardiology for further refills / management  Dispense: 90 tablet; Refill: 3    3. Hyperlipidemia, unspecified hyperlipidemia type  Assessment & Plan:  Most recent labs did not show good control, patient was called, and since that time  she has been taking atorvastatin nightly.  She had labs completed this morning, results still pending.  Continue current dose of atorvastatin, and can adjust if needed once results are available.      4. Cigarette nicotine dependence with nicotine-induced disorder  Assessment & Plan:  Counseled regarding risk for increased cardiovascular disease, respiratory infections and malignancies associated with long-term smoking use.  Not ready to quit at this time.      Other orders  -     amLODIPine (NORVASC) 10 MG tablet; Take 1 tablet by mouth Daily.  Dispense: 90 tablet; Refill: 3      [id number] Score: 2         Follow Up   Return in about 6 months (around 9/16/2023) for with Dr. De La Cruz.    Patient was given instructions and counseling regarding her condition or for health maintenance advice. Please see specific information pulled into the AVS if appropriate.     Viktoriya Cueva, APRN  03/16/23  10:11 EDT    Dictated Utilizing Dragon Dictation

## 2023-03-16 NOTE — ASSESSMENT & PLAN NOTE
Counseled regarding risk for increased cardiovascular disease, respiratory infections and malignancies associated with long-term smoking use.  Not ready to quit at this time.

## 2023-03-16 NOTE — ASSESSMENT & PLAN NOTE
Not well controlled.  Continue losartan, and metoprolol at current doses, we will increase amlodipine from 5 mg to 10 mg daily.  Recommend home monitoring of BP, and if still not controlled for patient to let us know we can make further adjustments as needed.

## 2023-03-16 NOTE — ASSESSMENT & PLAN NOTE
Most recent labs did not show good control, patient was called, and since that time she has been taking atorvastatin nightly.  She had labs completed this morning, results still pending.  Continue current dose of atorvastatin, and can adjust if needed once results are available.

## 2023-04-17 DIAGNOSIS — M72.2 PLANTAR FASCIITIS, BILATERAL: ICD-10-CM

## 2023-04-17 DIAGNOSIS — G47.00 INSOMNIA, UNSPECIFIED TYPE: ICD-10-CM

## 2023-04-18 RX ORDER — TRAZODONE HYDROCHLORIDE 50 MG/1
TABLET ORAL
Qty: 30 TABLET | Refills: 5 | Status: SHIPPED | OUTPATIENT
Start: 2023-04-18

## 2023-04-18 RX ORDER — DICLOFENAC SODIUM 75 MG/1
TABLET, DELAYED RELEASE ORAL
Qty: 60 TABLET | Refills: 0 | Status: SHIPPED | OUTPATIENT
Start: 2023-04-18

## 2023-04-27 ENCOUNTER — TELEPHONE (OUTPATIENT)
Dept: FAMILY MEDICINE CLINIC | Age: 63
End: 2023-04-27
Payer: COMMERCIAL

## 2023-04-27 NOTE — TELEPHONE ENCOUNTER
1st attempt: spoke with pt re overdue cologuard ordered on 2.23.23 , says she has blood in stool and cannot complete until that clears up. Will postpone a couple weeks.

## 2023-05-15 ENCOUNTER — HOSPITAL ENCOUNTER (OUTPATIENT)
Dept: MAMMOGRAPHY | Facility: HOSPITAL | Age: 63
Discharge: HOME | End: 2023-05-15
Payer: COMMERCIAL

## 2023-05-15 ENCOUNTER — HOSPITAL ENCOUNTER (OUTPATIENT)
Dept: CT IMAGING | Facility: HOSPITAL | Age: 63
Discharge: HOME | End: 2023-05-15
Payer: COMMERCIAL

## 2023-05-15 DIAGNOSIS — F17.219 CIGARETTE NICOTINE DEPENDENCE WITH NICOTINE-INDUCED DISORDER: ICD-10-CM

## 2023-05-15 DIAGNOSIS — Z12.31 ENCOUNTER FOR SCREENING MAMMOGRAM FOR MALIGNANT NEOPLASM OF BREAST: ICD-10-CM

## 2023-05-15 PROCEDURE — 77067 SCR MAMMO BI INCL CAD: CPT

## 2023-05-15 PROCEDURE — 77063 BREAST TOMOSYNTHESIS BI: CPT

## 2023-05-15 PROCEDURE — 71271 CT THORAX LUNG CANCER SCR C-: CPT

## 2023-05-30 DIAGNOSIS — R19.5 POSITIVE COLORECTAL CANCER SCREENING USING COLOGUARD TEST: Primary | ICD-10-CM

## 2023-09-11 DIAGNOSIS — I48.19 PERSISTENT ATRIAL FIBRILLATION: Chronic | ICD-10-CM

## 2023-09-11 DIAGNOSIS — F41.9 ANXIETY: ICD-10-CM

## 2023-09-11 DIAGNOSIS — E78.5 HYPERLIPIDEMIA, UNSPECIFIED HYPERLIPIDEMIA TYPE: ICD-10-CM

## 2023-09-11 DIAGNOSIS — I10 ESSENTIAL (PRIMARY) HYPERTENSION: ICD-10-CM

## 2023-09-11 DIAGNOSIS — M72.2 PLANTAR FASCIITIS, BILATERAL: ICD-10-CM

## 2023-09-11 DIAGNOSIS — G47.00 INSOMNIA, UNSPECIFIED TYPE: ICD-10-CM

## 2023-09-11 DIAGNOSIS — F17.219 CIGARETTE NICOTINE DEPENDENCE WITH NICOTINE-INDUCED DISORDER: ICD-10-CM

## 2023-09-11 DIAGNOSIS — I10 ESSENTIAL (PRIMARY) HYPERTENSION: Chronic | ICD-10-CM

## 2023-09-11 DIAGNOSIS — R06.2 WHEEZING: ICD-10-CM

## 2023-09-11 RX ORDER — AMLODIPINE BESYLATE 10 MG/1
10 TABLET ORAL DAILY
Qty: 90 TABLET | Refills: 1 | Status: SHIPPED | OUTPATIENT
Start: 2023-09-11

## 2023-09-13 NOTE — TELEPHONE ENCOUNTER
Pt lost her job and her insurance and doesn't know when she will be able to make an appt, pt advised we could give a month supply but would need to be seen

## 2023-09-15 RX ORDER — TRAZODONE HYDROCHLORIDE 50 MG/1
50 TABLET ORAL NIGHTLY PRN
Qty: 30 TABLET | Refills: 5 | Status: SHIPPED | OUTPATIENT
Start: 2023-09-15

## 2023-09-15 RX ORDER — ALBUTEROL SULFATE 90 UG/1
2 AEROSOL, METERED RESPIRATORY (INHALATION) EVERY 4 HOURS PRN
Qty: 18 G | Refills: 2 | Status: SHIPPED | OUTPATIENT
Start: 2023-09-15

## 2023-09-15 RX ORDER — CITALOPRAM 40 MG/1
40 TABLET ORAL DAILY
Qty: 90 TABLET | Refills: 1 | Status: SHIPPED | OUTPATIENT
Start: 2023-09-15

## 2023-09-15 RX ORDER — LOSARTAN POTASSIUM 50 MG/1
50 TABLET ORAL 2 TIMES DAILY
Qty: 180 TABLET | Refills: 1 | Status: SHIPPED | OUTPATIENT
Start: 2023-09-15

## 2023-09-15 RX ORDER — DICLOFENAC SODIUM 75 MG/1
75 TABLET, DELAYED RELEASE ORAL 2 TIMES DAILY PRN
Qty: 60 TABLET | Refills: 0 | Status: SHIPPED | OUTPATIENT
Start: 2023-09-15

## 2023-09-15 RX ORDER — METOPROLOL TARTRATE 100 MG/1
100 TABLET ORAL 2 TIMES DAILY
Qty: 180 TABLET | Refills: 1 | Status: SHIPPED | OUTPATIENT
Start: 2023-09-15

## 2023-09-15 RX ORDER — ATORVASTATIN CALCIUM 40 MG/1
40 TABLET, FILM COATED ORAL
Qty: 90 TABLET | Refills: 1 | Status: SHIPPED | OUTPATIENT
Start: 2023-09-15

## 2023-09-15 RX ORDER — OMEPRAZOLE 20 MG/1
20 CAPSULE, DELAYED RELEASE ORAL DAILY
Qty: 30 CAPSULE | Refills: 0 | Status: SHIPPED | OUTPATIENT
Start: 2023-09-15

## 2023-09-18 ENCOUNTER — TELEPHONE (OUTPATIENT)
Dept: CARDIOLOGY | Facility: CLINIC | Age: 63
End: 2023-09-18

## 2023-09-18 NOTE — TELEPHONE ENCOUNTER
[facility] received a fax that requires your attention. The document has been indexed to the patient’s chart for your review.      Reason for sending: EXTERNAL MEDICAL RECORD NOTIFICATION     Documents Description: YULIYA HARDEN NEEDED-9.16.23    Name of Sender: [employer]     Date Indexed: 9.16.23

## 2023-09-18 NOTE — TELEPHONE ENCOUNTER
CALLED PHONE NUMBER PROVIDED FOR PA IN [employer].  SW [phone] AND WAS ADVISED [facility] IS ENTERING WRONG NDC.  CORRECT NDC IS [phone].  CALLED PHARMACY AND GAVE THEM CORRECT NDC.  PHARMACY STATED THAT IS THE NDC THEY ARE ENTERING.  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  SW PT.  PT STATED SHE LOST HER JOB AND INS END OF JUNE 2023.  I PROVIDED HER THE NUMBER TO [facility] TO SEE IF SHE QUALIFIES FOR HELP.  SHE STATED SHE IS GOING TO CALL THEM.

## 2023-12-14 ENCOUNTER — TELEPHONE (OUTPATIENT)
Dept: FAMILY MEDICINE CLINIC | Age: 63
End: 2023-12-14

## 2024-06-19 ENCOUNTER — HOSPITAL ENCOUNTER (EMERGENCY)
Facility: HOSPITAL | Age: 64
Discharge: HOME | End: 2024-06-19
Attending: EMERGENCY MEDICINE
Payer: COMMERCIAL

## 2024-06-19 ENCOUNTER — APPOINTMENT (OUTPATIENT)
Dept: GENERAL RADIOLOGY | Facility: HOSPITAL | Age: 64
End: 2024-06-19
Payer: COMMERCIAL

## 2024-06-19 VITALS
OXYGEN SATURATION: 97 % | SYSTOLIC BLOOD PRESSURE: 179 MMHG | RESPIRATION RATE: 20 BRPM | HEIGHT: 63 IN | WEIGHT: 200.84 LBS | DIASTOLIC BLOOD PRESSURE: 120 MMHG | HEART RATE: 80 BPM | BODY MASS INDEX: 35.59 KG/M2 | TEMPERATURE: 98 F

## 2024-06-19 DIAGNOSIS — R07.9 CHEST PAIN, UNSPECIFIED TYPE: Primary | ICD-10-CM

## 2024-06-19 LAB
ALBUMIN SERPL-MCNC: 4 G/DL (ref 3.5–5.2)
ALBUMIN/GLOB SERPL: 1.5 G/DL
ALP SERPL-CCNC: 138 U/L (ref 39–117)
ALT SERPL W P-5'-P-CCNC: 11 U/L (ref 1–33)
ANION GAP SERPL CALCULATED.3IONS-SCNC: 9.9 MMOL/L (ref 5–15)
AST SERPL-CCNC: 11 U/L (ref 1–32)
BASOPHILS # BLD AUTO: 0.04 10*3/MM3 (ref 0–0.2)
BASOPHILS NFR BLD AUTO: 0.5 % (ref 0–1.5)
BILIRUB SERPL-MCNC: 0.3 MG/DL (ref 0–1.2)
BUN SERPL-MCNC: 11 MG/DL (ref 8–23)
BUN/CREAT SERPL: 15.7 (ref 7–25)
CALCIUM SPEC-SCNC: 9.2 MG/DL (ref 8.6–10.5)
CHLORIDE SERPL-SCNC: 103 MMOL/L (ref 98–107)
CO2 SERPL-SCNC: 26.1 MMOL/L (ref 22–29)
CREAT SERPL-MCNC: 0.7 MG/DL (ref 0.57–1)
DEPRECATED RDW RBC AUTO: 45.3 FL (ref 37–54)
EGFRCR SERPLBLD CKD-EPI 2021: 97.3 ML/MIN/1.73
EOSINOPHIL # BLD AUTO: 0.08 10*3/MM3 (ref 0–0.4)
EOSINOPHIL NFR BLD AUTO: 0.9 % (ref 0.3–6.2)
ERYTHROCYTE [DISTWIDTH] IN BLOOD BY AUTOMATED COUNT: 13.3 % (ref 12.3–15.4)
GEN 5 2HR TROPONIN T REFLEX: 34 NG/L
GLOBULIN UR ELPH-MCNC: 2.6 GM/DL
GLUCOSE SERPL-MCNC: 142 MG/DL (ref 65–99)
HCT VFR BLD AUTO: 47.5 % (ref 34–46.6)
HGB BLD-MCNC: 15.5 G/DL (ref 12–15.9)
HOLD SPECIMEN: NORMAL
HOLD SPECIMEN: NORMAL
IMM GRANULOCYTES # BLD AUTO: 0.02 10*3/MM3 (ref 0–0.05)
IMM GRANULOCYTES NFR BLD AUTO: 0.2 % (ref 0–0.5)
LIPASE SERPL-CCNC: 11 U/L (ref 13–60)
LYMPHOCYTES # BLD AUTO: 2.04 10*3/MM3 (ref 0.7–3.1)
LYMPHOCYTES NFR BLD AUTO: 23.7 % (ref 19.6–45.3)
MAGNESIUM SERPL-MCNC: 2.1 MG/DL (ref 1.6–2.4)
MCH RBC QN AUTO: 30.2 PG (ref 26.6–33)
MCHC RBC AUTO-ENTMCNC: 32.6 G/DL (ref 31.5–35.7)
MCV RBC AUTO: 92.6 FL (ref 79–97)
MONOCYTES # BLD AUTO: 0.51 10*3/MM3 (ref 0.1–0.9)
MONOCYTES NFR BLD AUTO: 5.9 % (ref 5–12)
NEUTROPHILS NFR BLD AUTO: 5.93 10*3/MM3 (ref 1.7–7)
NEUTROPHILS NFR BLD AUTO: 68.8 % (ref 42.7–76)
NRBC BLD AUTO-RTO: 0 /100 WBC (ref 0–0.2)
NT-PROBNP SERPL-MCNC: 893.2 PG/ML (ref 0–900)
PLATELET # BLD AUTO: 223 10*3/MM3 (ref 140–450)
PMV BLD AUTO: 10.8 FL (ref 6–12)
POTASSIUM SERPL-SCNC: 4.6 MMOL/L (ref 3.5–5.2)
PROT SERPL-MCNC: 6.6 G/DL (ref 6–8.5)
RBC # BLD AUTO: 5.13 10*6/MM3 (ref 3.77–5.28)
SODIUM SERPL-SCNC: 139 MMOL/L (ref 136–145)
TROPONIN T DELTA: 24 NG/L
TROPONIN T SERPL HS-MCNC: 10 NG/L
WBC NRBC COR # BLD AUTO: 8.62 10*3/MM3 (ref 3.4–10.8)
WHOLE BLOOD HOLD COAG: NORMAL
WHOLE BLOOD HOLD SPECIMEN: NORMAL

## 2024-06-19 PROCEDURE — 83690 ASSAY OF LIPASE: CPT

## 2024-06-19 PROCEDURE — 96374 THER/PROPH/DIAG INJ IV PUSH: CPT

## 2024-06-19 PROCEDURE — 84484 ASSAY OF TROPONIN QUANT: CPT

## 2024-06-19 PROCEDURE — 84484 ASSAY OF TROPONIN QUANT: CPT | Performed by: EMERGENCY MEDICINE

## 2024-06-19 PROCEDURE — 25010000002 KETOROLAC TROMETHAMINE PER 15 MG: Performed by: EMERGENCY MEDICINE

## 2024-06-19 PROCEDURE — 83735 ASSAY OF MAGNESIUM: CPT

## 2024-06-19 PROCEDURE — 80053 COMPREHEN METABOLIC PANEL: CPT

## 2024-06-19 PROCEDURE — 93005 ELECTROCARDIOGRAM TRACING: CPT | Performed by: EMERGENCY MEDICINE

## 2024-06-19 PROCEDURE — 71045 X-RAY EXAM CHEST 1 VIEW: CPT

## 2024-06-19 PROCEDURE — 85025 COMPLETE CBC W/AUTO DIFF WBC: CPT | Performed by: EMERGENCY MEDICINE

## 2024-06-19 PROCEDURE — 99284 EMERGENCY DEPT VISIT MOD MDM: CPT

## 2024-06-19 PROCEDURE — 96375 TX/PRO/DX INJ NEW DRUG ADDON: CPT

## 2024-06-19 PROCEDURE — 93010 ELECTROCARDIOGRAM REPORT: CPT | Performed by: INTERNAL MEDICINE

## 2024-06-19 PROCEDURE — 25010000002 METHYLPREDNISOLONE PER 125 MG: Performed by: EMERGENCY MEDICINE

## 2024-06-19 PROCEDURE — 36415 COLL VENOUS BLD VENIPUNCTURE: CPT | Performed by: EMERGENCY MEDICINE

## 2024-06-19 PROCEDURE — 83880 ASSAY OF NATRIURETIC PEPTIDE: CPT

## 2024-06-19 PROCEDURE — 93005 ELECTROCARDIOGRAM TRACING: CPT

## 2024-06-19 RX ORDER — PREDNISONE 20 MG/1
TABLET ORAL
Qty: 10 TABLET | Refills: 0 | Status: SHIPPED | OUTPATIENT
Start: 2024-06-19

## 2024-06-19 RX ORDER — KETOROLAC TROMETHAMINE 15 MG/ML
15 INJECTION, SOLUTION INTRAMUSCULAR; INTRAVENOUS ONCE
Status: COMPLETED | OUTPATIENT
Start: 2024-06-19 | End: 2024-06-19

## 2024-06-19 RX ORDER — SODIUM CHLORIDE 0.9 % (FLUSH) 0.9 %
10 SYRINGE (ML) INJECTION AS NEEDED
Status: DISCONTINUED | OUTPATIENT
Start: 2024-06-19 | End: 2024-06-19 | Stop reason: HOSPADM

## 2024-06-19 RX ORDER — DIFLUNISAL 500 MG/1
500 TABLET, FILM COATED ORAL 2 TIMES DAILY PRN
Qty: 20 TABLET | Refills: 0 | Status: SHIPPED | OUTPATIENT
Start: 2024-06-19

## 2024-06-19 RX ORDER — ASPIRIN 81 MG/1
324 TABLET, CHEWABLE ORAL ONCE
Status: DISCONTINUED | OUTPATIENT
Start: 2024-06-19 | End: 2024-06-19 | Stop reason: HOSPADM

## 2024-06-19 RX ADMIN — METHYLPREDNISOLONE SODIUM SUCCINATE 125 MG: 125 INJECTION INTRAMUSCULAR; INTRAVENOUS at 20:20

## 2024-06-19 RX ADMIN — KETOROLAC TROMETHAMINE 15 MG: 15 INJECTION, SOLUTION INTRAMUSCULAR; INTRAVENOUS at 20:20

## 2024-06-19 RX ADMIN — METOPROLOL TARTRATE 5 MG: 1 INJECTION, SOLUTION INTRAVENOUS at 20:41

## 2024-06-19 NOTE — ED PROVIDER NOTES
Time: 5:45 PM EDT  Date of encounter:  6/19/2024  Independent Historian/Clinical History and Information was obtained by:   Patient    History is limited by: N/A    Chief Complaint: Chest pain      History of Present Illness:  Patient is a 63 y.o. year old female who presents to the emergency department for evaluation of pain radiating to left arm for several days.  The pain has been constant for several days.  The pain is worse with movement and better at rest.  She has had no fever chills cough or runny nose.  She has had no shortness of breath, nausea, sweating or other new symptoms.    HPI    Patient Care Team  Primary Care Provider: Clarissa Vasques APRN    Past Medical History:     No Known Allergies  Past Medical History:   Diagnosis Date    Acute upper respiratory infection, unspecified     Anxiety disorder, unspecified     Atrial fibrillation     Dorsalgia, unspecified     Essential (primary) hypertension     Heartburn     HLD (hyperlipidemia)     unspecified    Insomnia, unspecified     Localized edema     Nicotine dependence, unspecified, uncomplicated      Past Surgical History:   Procedure Laterality Date    ENDOSCOPY  1999    EXERCISE STRESS - CONVERTED  2014    treadmill stress test; NEG    LAMINECTOMY      lumbar region     Family History   Problem Relation Age of Onset    Hypertension Other     Cancer Other         type not specified    Diabetes type II Other        Home Medications:  Prior to Admission medications    Medication Sig Start Date End Date Taking? Authorizing Provider   albuterol sulfate  (90 Base) MCG/ACT inhaler Inhale 2 puffs Every 4 (Four) Hours As Needed for Wheezing. 9/15/23   Clarissa Vasques APRN   amLODIPine (NORVASC) 10 MG tablet Take 1 tablet by mouth Daily. 9/11/23   Viktoriya Cueva APRN   atorvastatin (LIPITOR) 40 MG tablet Take 1 tablet by mouth every night at bedtime. 9/15/23   Clarissa Vasques APRN   citalopram (CeleXA) 40 MG tablet Take 1 tablet by mouth Daily.  9/15/23   Clarissa Vasques APRN   diclofenac (VOLTAREN) 75 MG EC tablet Take 1 tablet by mouth 2 (Two) Times a Day As Needed (pain). for pain 9/15/23   Clarissa Vasques APRN   losartan (COZAAR) 50 MG tablet Take 1 tablet by mouth 2 (Two) Times a Day. 9/15/23   Clarissa Vasques APRN   metoprolol tartrate (LOPRESSOR) 100 MG tablet Take 1 tablet by mouth 2 (Two) Times a Day. No further refills from our office , will need to follow up with cardiology for management 9/15/23   Clarissa Vasques APRN   omeprazole (priLOSEC) 20 MG capsule Take 1 capsule by mouth Daily. 9/15/23   Clarissa Vasques APRN   rivaroxaban (Xarelto) 20 MG tablet Take 1 tablet by mouth Daily With Dinner. 9/11/23   Viktoriya Cueva APRN   traZODone (DESYREL) 50 MG tablet Take 1 tablet by mouth At Night As Needed for Sleep. 9/15/23   Clarissa Vasques APRN        Social History:   Social History     Tobacco Use    Smoking status: Every Day     Current packs/day: 0.25     Average packs/day: 0.3 packs/day for 30.5 years (7.6 ttl pk-yrs)     Types: Cigarettes     Start date: 1994    Smokeless tobacco: Never    Tobacco comments:     1-5 cig per day; .5ppd   Vaping Use    Vaping status: Never Used   Substance Use Topics    Alcohol use: Yes     Comment: very seldom, maybe once or twice a week    Drug use: Never         Review of Systems:  Review of Systems   Constitutional:  Negative for chills and fever.   HENT:  Negative for congestion, ear pain and sore throat.    Eyes:  Negative for pain.   Respiratory:  Negative for cough, chest tightness and shortness of breath.    Cardiovascular:  Positive for chest pain.   Gastrointestinal:  Negative for abdominal pain, diarrhea, nausea and vomiting.   Genitourinary:  Negative for flank pain and hematuria.   Musculoskeletal:  Positive for arthralgias. Negative for joint swelling.   Skin:  Negative for pallor.   Neurological:  Negative for seizures and headaches.   All other systems reviewed and are negative.    "    Physical Exam:  BP (!) 179/120 (BP Location: Right arm, Patient Position: Lying)   Pulse 80   Temp 98 °F (36.7 °C) (Oral)   Resp 20   Ht 160 cm (63\")   Wt 91.1 kg (200 lb 13.4 oz)   SpO2 97%   BMI 35.58 kg/m²     Physical Exam  Vitals and nursing note reviewed.   Constitutional:       General: She is not in acute distress.     Appearance: Normal appearance. She is not toxic-appearing.   HENT:      Head: Normocephalic and atraumatic.      Mouth/Throat:      Mouth: Mucous membranes are moist.   Eyes:      General: No scleral icterus.     Pupils: Pupils are equal, round, and reactive to light.   Cardiovascular:      Rate and Rhythm: Normal rate and regular rhythm.      Pulses: Normal pulses.      Heart sounds: Normal heart sounds.   Pulmonary:      Effort: Pulmonary effort is normal. No respiratory distress.      Breath sounds: Normal breath sounds.   Chest:      Chest wall: Tenderness present.      Comments: Left chest wall tenderness to reproduce the patient's symptoms.  Abdominal:      General: Abdomen is flat. There is no distension.      Palpations: Abdomen is soft.      Tenderness: There is no abdominal tenderness.   Musculoskeletal:         General: Normal range of motion.      Cervical back: Normal range of motion and neck supple.   Skin:     General: Skin is warm and dry.   Neurological:      General: No focal deficit present.      Mental Status: She is alert and oriented to person, place, and time. Mental status is at baseline.   Psychiatric:         Mood and Affect: Mood normal.         Behavior: Behavior normal.                  Procedures:  Procedures      Medical Decision Making:      Comorbidities that affect care:    Atrial Fibrillation, Smoking    External Notes reviewed:    Previous Clinic Note: Cardiology clinic visit for hypertension and atrial fibrillation in March 2023      The following orders were placed and all results were independently analyzed by me:  Orders Placed This Encounter "   Procedures    XR Chest 1 View    [place] Draw    High Sensitivity Troponin T    Comprehensive Metabolic Panel    Lipase    BNP    Magnesium    CBC Auto Differential    High Sensitivity Troponin T 2Hr    Undress & Gown    Continuous Pulse Oximetry    ECG 12 Lead ED Triage Standing Order; Chest Pain    CBC & Differential    Green Top (Gel)    Lavender Top    Gold Top - SST    Light Blue Top       Medications Given in the Emergency Department:  Medications   ketorolac (TORADOL) injection 15 mg (15 mg Intravenous Given 6/19/24 2020)   methylPREDNISolone sodium succinate (SOLU-Medrol) 125 mg in sterile water (preservative free) 2 mL (125 mg Intravenous Given 6/19/24 2020)   metoprolol tartrate (LOPRESSOR) injection 5 mg (5 mg Intravenous Given 6/19/24 2041)        ED Course:    ED Course as of 06/20/24 0225 Wed Jun 19, 2024 1745 --- PROVIDER IN TRIAGE NOTE ---    The patient was evaluated by Ernestina benitez in triage. Orders were placed and the patient is currently awaiting disposition.    [AJ]   u Jun 20, 2024 0224 HS Troponin T(!): 34 [PP]      ED Course User Index  [AJ] Ernestina Tay PA-C  [PP] Feliz Balbuena, DO     EKG: Atrial fibrillation with rate of 79 beats per min  Abnormal P wave and HI interval  No acute ischemic changes are noted              Labs:    Lab Results (last 24 hours)       Procedure Component Value Units Date/Time    High Sensitivity Troponin T [[id number]]  (Normal) Collected: 06/19/24 1755    Specimen: Blood from Arm, Right Updated: 06/19/24 1830     HS Troponin T 10 ng/L     Narrative:      High Sensitive Troponin T Reference Range:  <14.0 ng/L- Negative Female for AMI  <22.0 ng/L- Negative Male for AMI  >=14 - Abnormal Female indicating possible myocardial injury.  >=22 - Abnormal Male indicating possible myocardial injury.   Clinicians would have to utilize clinical acumen, EKG, Troponin, and serial changes to determine if it is an Acute Myocardial Infarction or  myocardial injury due to an underlying chronic condition.         CBC & Differential [[id number]]  (Abnormal) Collected: 06/19/24 1755    Specimen: Blood from Arm, Right Updated: 06/19/24 1807    Narrative:      The following orders were created for panel order CBC & Differential.  Procedure                               Abnormality         Status                     ---------                               -----------         ------                     CBC Auto Differential[[id number]]        Abnormal            Final result                 Please view results for these tests on the individual orders.    Comprehensive Metabolic Panel [[id number]]  (Abnormal) Collected: 06/19/24 1755    Specimen: Blood from Arm, Right Updated: 06/19/24 1830     Glucose 142 mg/dL      BUN 11 mg/dL      Creatinine 0.70 mg/dL      Sodium 139 mmol/L      Potassium 4.6 mmol/L      Chloride 103 mmol/L      CO2 26.1 mmol/L      Calcium 9.2 mg/dL      Total Protein 6.6 g/dL      Albumin 4.0 g/dL      ALT (SGPT) 11 U/L      AST (SGOT) 11 U/L      Alkaline Phosphatase 138 U/L      Total Bilirubin 0.3 mg/dL      Globulin 2.6 gm/dL      A/G Ratio 1.5 g/dL      BUN/Creatinine Ratio 15.7     Anion Gap 9.9 mmol/L      eGFR 97.3 mL/min/1.73     Narrative:      GFR Normal >60  Chronic Kidney Disease <60  Kidney Failure <15      Lipase [[id number]]  (Abnormal) Collected: 06/19/24 1755    Specimen: Blood from Arm, Right Updated: 06/19/24 1830     Lipase 11 U/L     BNP [[id number]]  (Normal) Collected: 06/19/24 1755    Specimen: Blood from Arm, Right Updated: 06/19/24 1825     proBNP 893.2 pg/mL     Narrative:      This assay is used as an aid in the diagnosis of individuals suspected of having heart failure. It can be used as an aid in the diagnosis of acute decompensated heart failure (ADHF) in patients presenting with signs and symptoms of ADHF to the emergency department (ED). In addition, NT-proBNP of <300 pg/mL indicates ADHF is not likely.    Age  Range Result Interpretation  NT-proBNP Concentration (pg/mL:      <50             Positive            >450                   Gray                 300-450                    Negative             <300    50-75           Positive            >900                  Gray                300-900                  Negative            <300      >75             Positive            >1800                  Gray                300-1800                  Negative            <300    Magnesium [[id number]]  (Normal) Collected: 06/19/24 1755    Specimen: Blood from Arm, Right Updated: 06/19/24 1830     Magnesium 2.1 mg/dL     CBC Auto Differential [[id number]]  (Abnormal) Collected: 06/19/24 1755    Specimen: Blood from Arm, Right Updated: 06/19/24 1807     WBC 8.62 10*3/mm3      RBC 5.13 10*6/mm3      Hemoglobin 15.5 g/dL      Hematocrit 47.5 %      MCV 92.6 fL      MCH 30.2 pg      MCHC 32.6 g/dL      RDW 13.3 %      RDW-SD 45.3 fl      MPV 10.8 fL      Platelets 223 10*3/mm3      Neutrophil % 68.8 %      Lymphocyte % 23.7 %      Monocyte % 5.9 %      Eosinophil % 0.9 %      Basophil % 0.5 %      Immature Grans % 0.2 %      Neutrophils, Absolute 5.93 10*3/mm3      Lymphocytes, Absolute 2.04 10*3/mm3      Monocytes, Absolute 0.51 10*3/mm3      Eosinophils, Absolute 0.08 10*3/mm3      Basophils, Absolute 0.04 10*3/mm3      Immature Grans, Absolute 0.02 10*3/mm3      nRBC 0.0 /100 WBC     High Sensitivity Troponin T 2Hr [[id number]]  (Abnormal) Collected: 06/19/24 2116    Specimen: Blood from Arm, Left Updated: 06/19/24 2201     HS Troponin T 34 ng/L      Troponin T Delta 24 ng/L     Narrative:      High Sensitive Troponin T Reference Range:  <14.0 ng/L- Negative Female for AMI  <22.0 ng/L- Negative Male for AMI  >=14 - Abnormal Female indicating possible myocardial injury.  >=22 - Abnormal Male indicating possible myocardial injury.   Clinicians would have to utilize clinical acumen, EKG, Troponin, and serial changes to determine if it is  an Acute Myocardial Infarction or myocardial injury due to an underlying chronic condition.                  Imaging:    XR Chest 1 View    Result Date: 6/19/2024  XR CHEST 1 VW Date of Exam: 6/19/2024 6:05 PM EDT Indication: Chest Pain Triage Protocol Comparison: None Findings: Heart size and pulmonary vessels are within normal limits. There is minimal linear atelectasis in the left perihilar region. Lungs are otherwise clear. No pleural effusion. No pneumothorax.     Impression: 1. No acute cardiopulmonary disease. Electronically Signed: Cordell Ayala MD  6/19/2024 6:11 PM EDT  Workstation ID: [id number]       Differential Diagnosis and Discussion:    Chest Pain:  Based on the patient's signs and symptoms, I considered aortic dissection, myocardial infaction, pulmonary embolism, cardiac tamponade, pericarditis, pneumothorax, musculoskeletal chest pain and other differential diagnosis as an etiology of the patient's chest pain.     All labs were reviewed and interpreted by me.  EKG was interpreted by me.    MDM     Amount and/or Complexity of Data Reviewed  Clinical lab tests: reviewed  Tests in the radiology section of CPT®: reviewed  Tests in the medicine section of CPT®: reviewed                 Patient Care Considerations:    CT CHEST: I considered ordering a CT scan of the chest, however the patient has reproducible chest pain and no signs of pulmonary embolism.      Consultants/Shared Management Plan:    None    Social Determinants of Health:    Patient is independent, reliable, and has access to care.       Disposition and Care Coordination:    Discharged: I considered escalation of care by admitting this patient to the hospital, however patient is a low risk heart score and no signs of pulmonary embolism.    I have explained discharge medications and the need for follow up with the patient/caretakers. This was also printed in the discharge instructions. Patient was discharged with the following medications  and follow up:      Medication List        New Prescriptions      diflunisal 500 MG tablet tablet  Commonly known as: DOLOBID  Take 1 tablet by mouth 2 (Two) Times a Day As Needed (Pain).     predniSONE 20 MG tablet  Commonly known as: DELTASONE  Take 2 p.o. daily for 5 days.               Where to Get Your Medications        These medications were sent to [facility] - [place], [place] - [address] - [phone]  - [phone]   [address] [facility] [address]      Phone: [phone]   diflunisal 500 MG tablet tablet  predniSONE 20 MG tablet      TjrClarissa, APRN  [address]  [address]  [facility] [address] [phone]    In 1 day         Final diagnoses:   Chest pain, unspecified type        ED Disposition       ED Disposition   Discharge    Condition   Stable    Comment   --               This medical record created using voice recognition software.             Feliz Balbuena,   06/20/24 0225

## 2024-06-20 NOTE — DISCHARGE INSTRUCTIONS
Take medication as directed.  Return for worsening symptoms.  Do not smoke.  Follow-up with your doctor 1 to 2 days

## 2024-06-24 LAB
QT INTERVAL: 363 MS
QT INTERVAL: 366 MS
QTC INTERVAL: 420 MS
QTC INTERVAL: 429 MS

## 2024-07-18 ENCOUNTER — TELEPHONE (OUTPATIENT)
Dept: FAMILY MEDICINE CLINIC | Age: 64
End: 2024-07-18
Payer: COMMERCIAL

## 2024-07-18 NOTE — TELEPHONE ENCOUNTER
Please reach out to her and have her schedule with cardiology since she has not seen them since last year  - she needs to follow up with them due to her history   ----- Message -----   From: Feliz Balbuena DO   Sent: 6/20/2024   9:54 AM EDT   To: GALINA Lockwood

## 2024-11-21 ENCOUNTER — OFFICE VISIT (OUTPATIENT)
Dept: FAMILY MEDICINE CLINIC | Age: 64
End: 2024-11-21
Payer: COMMERCIAL

## 2024-11-21 VITALS
HEART RATE: 51 BPM | HEIGHT: 63 IN | DIASTOLIC BLOOD PRESSURE: 97 MMHG | TEMPERATURE: 98.5 F | BODY MASS INDEX: 35.15 KG/M2 | SYSTOLIC BLOOD PRESSURE: 151 MMHG | OXYGEN SATURATION: 98 % | WEIGHT: 198.4 LBS

## 2024-11-21 DIAGNOSIS — F17.219 CIGARETTE NICOTINE DEPENDENCE WITH NICOTINE-INDUCED DISORDER: ICD-10-CM

## 2024-11-21 DIAGNOSIS — I48.19 PERSISTENT ATRIAL FIBRILLATION: Chronic | ICD-10-CM

## 2024-11-21 DIAGNOSIS — R05.9 COUGH, UNSPECIFIED TYPE: ICD-10-CM

## 2024-11-21 DIAGNOSIS — F41.9 ANXIETY: ICD-10-CM

## 2024-11-21 DIAGNOSIS — R06.2 WHEEZING: ICD-10-CM

## 2024-11-21 DIAGNOSIS — R73.09 ELEVATED GLUCOSE: ICD-10-CM

## 2024-11-21 DIAGNOSIS — I10 ESSENTIAL (PRIMARY) HYPERTENSION: ICD-10-CM

## 2024-11-21 DIAGNOSIS — J44.1 COPD WITH EXACERBATION: Primary | ICD-10-CM

## 2024-11-21 DIAGNOSIS — Z13.6 SCREENING FOR CARDIOVASCULAR CONDITION: ICD-10-CM

## 2024-11-21 LAB
EXPIRATION DATE: NORMAL
FLUAV AG UPPER RESP QL IA.RAPID: NOT DETECTED
FLUBV AG UPPER RESP QL IA.RAPID: NOT DETECTED
INTERNAL CONTROL: NORMAL
Lab: NORMAL
SARS-COV-2 AG UPPER RESP QL IA.RAPID: NOT DETECTED

## 2024-11-21 PROCEDURE — 99214 OFFICE O/P EST MOD 30 MIN: CPT | Performed by: NURSE PRACTITIONER

## 2024-11-21 PROCEDURE — 3077F SYST BP >= 140 MM HG: CPT | Performed by: NURSE PRACTITIONER

## 2024-11-21 PROCEDURE — 1159F MED LIST DOCD IN RCRD: CPT | Performed by: NURSE PRACTITIONER

## 2024-11-21 PROCEDURE — 87428 SARSCOV & INF VIR A&B AG IA: CPT | Performed by: NURSE PRACTITIONER

## 2024-11-21 PROCEDURE — 3080F DIAST BP >= 90 MM HG: CPT | Performed by: NURSE PRACTITIONER

## 2024-11-21 PROCEDURE — 1160F RVW MEDS BY RX/DR IN RCRD: CPT | Performed by: NURSE PRACTITIONER

## 2024-11-21 RX ORDER — CITALOPRAM HYDROBROMIDE 40 MG/1
40 TABLET ORAL DAILY
Qty: 90 TABLET | Refills: 1 | Status: SHIPPED | OUTPATIENT
Start: 2024-11-21

## 2024-11-21 RX ORDER — ALBUTEROL SULFATE 90 UG/1
2 INHALANT RESPIRATORY (INHALATION) EVERY 4 HOURS PRN
Qty: 18 G | Refills: 0 | OUTPATIENT
Start: 2024-11-21

## 2024-11-21 RX ORDER — AZITHROMYCIN 250 MG/1
TABLET, FILM COATED ORAL
Qty: 6 TABLET | Refills: 0 | Status: SHIPPED | OUTPATIENT
Start: 2024-11-21

## 2024-11-21 RX ORDER — CITALOPRAM HYDROBROMIDE 40 MG/1
40 TABLET ORAL DAILY
Qty: 90 TABLET | Refills: 0 | OUTPATIENT
Start: 2024-11-21

## 2024-11-21 RX ORDER — ALBUTEROL SULFATE 90 UG/1
2 INHALANT RESPIRATORY (INHALATION) EVERY 4 HOURS PRN
Qty: 18 G | Refills: 2 | Status: SHIPPED | OUTPATIENT
Start: 2024-11-21

## 2024-11-21 NOTE — PROGRESS NOTES
Chief Complaint  Daily Cartagena presents to [facility] for URI (Cough onset for a few days. Denies any other sx. Has been taking Nyquil )    Subjective     History of Present Illness  Daily  here today for concerns of URI symptoms      Known Exposure to positive case?  No  Date of exposure?  unknown date  Date of symptoms start?   4 days   (Symptoms may appear 2-14 days after exposure )    Fever or chills?  No  Cough?   yes  Shortness of breath or difficulty breathing?  no  Fatigue?   yes  Muscle or body aches?  no   Headache?   yes  New loss of taste or smell? no  Sore throat?  yes  Congestion or runny nose? yes  Nausea or vomiting?   no  Diarrhea?   no    Taking any medications at home to help with symptoms?Yes, describe: nyquil  Any prior vaccine to covid?   no  Any prior vaccine to flu this season? no  Any significant health problems / existing lung / heart problems?  Yes, describe: copd      She is out of medication - xarelto due to cost  will place referral to           Assessment and Plan     Need to come in for wellness and labs for refill  Diagnoses and all orders for this visit:    1. COPD with exacerbation (Primary)  -     azithromycin (Zithromax Z-Carlito) 250 MG tablet; Take 2 tablets by mouth on day 1, then 1 tablet daily on days 2-5  Dispense: 6 tablet; Refill: 0    2. Cough, unspecified type  -     POCT SARS-CoV-2 Antigen IFEANYI + Flu    3. Persistent atrial fibrillation  Comments:  I will provide refills for 90 days to allow time to follow up with cardiology but will need to follow up with them for ongoing management  Orders:  -     rivaroxaban (Xarelto) 20 MG tablet; Take 1 tablet by mouth Daily With Dinner.  Dispense: 90 tablet; Refill: 1  -     CBC No Differential; Future    4. Cigarette nicotine dependence with nicotine-induced disorder    5. Screening for cardiovascular condition  -     Lipid panel; Future  -     Comprehensive metabolic panel; Future  -     ABO/Rh;  "Future    6. Elevated glucose  -     Hemoglobin A1c; Future            Follow Up   Return in about 4 weeks (around 12/19/2024) for Recheck, Annual physical.  Future Appointments   Date Time Provider Department Center   12/11/2024  2:15 PM Clarissa Vasques APRN [facility]       New Medications Ordered This Visit   Medications    rivaroxaban (Xarelto) 20 MG tablet     Sig: Take 1 tablet by mouth Daily With Dinner.     Dispense:  90 tablet     Refill:  1    azithromycin (Zithromax Z-Carlito) 250 MG tablet     Sig: Take 2 tablets by mouth on day 1, then 1 tablet daily on days 2-5     Dispense:  6 tablet     Refill:  0       Medications Discontinued During This Encounter   Medication Reason    rivaroxaban (Xarelto) 20 MG tablet Reorder    predniSONE (DELTASONE) 20 MG tablet *Therapy completed    diflunisal (DOLOBID) 500 MG tablet tablet *Therapy completed          Review of Systems    Objective     Vitals:    11/21/24 1015   BP: 151/97   BP Location: Right arm   Patient Position: Sitting   Cuff Size: Large Adult   Pulse: 51   Temp: 98.5 °F (36.9 °C)   TempSrc: Oral   SpO2: 98%   Weight: 90 kg (198 lb 6.4 oz)   Height: 160 cm (63\")           Physical Exam  Constitutional:       General: She is not in acute distress.     Appearance: Normal appearance.   HENT:      Head: Normocephalic.   Cardiovascular:      Rate and Rhythm: Normal rate. Rhythm irregularly irregular.   Pulmonary:      Effort: Pulmonary effort is normal.      Breath sounds: Decreased air movement present. Examination of the right-lower field reveals rhonchi. Examination of the left-lower field reveals rhonchi. Wheezing and rhonchi present.   Musculoskeletal:         General: Normal range of motion.   Neurological:      General: No focal deficit present.      Mental Status: She is alert and oriented to person, place, and time.   Psychiatric:         Mood and Affect: Mood normal.         Behavior: Behavior normal.               Result Review        No Known " Allergies   Past Medical History:   Diagnosis Date    Acute upper respiratory infection, unspecified     Anxiety disorder, unspecified     Atrial fibrillation     Dorsalgia, unspecified     Essential (primary) hypertension     Heartburn     HLD (hyperlipidemia)     unspecified    Insomnia, unspecified     Localized edema     Nicotine dependence, unspecified, uncomplicated      Current Outpatient Medications   Medication Sig Dispense Refill    amLODIPine (NORVASC) 10 MG tablet Take 1 tablet by mouth Daily. 90 tablet 1    atorvastatin (LIPITOR) 40 MG tablet Take 1 tablet by mouth every night at bedtime. 90 tablet 1    diclofenac (VOLTAREN) 75 MG EC tablet Take 1 tablet by mouth 2 (Two) Times a Day As Needed (pain). for pain 60 tablet 0    losartan (COZAAR) 50 MG tablet Take 1 tablet by mouth 2 (Two) Times a Day. 180 tablet 1    metoprolol tartrate (LOPRESSOR) 100 MG tablet Take 1 tablet by mouth 2 (Two) Times a Day. No further refills from our office , will need to follow up with cardiology for management 180 tablet 1    omeprazole (priLOSEC) 20 MG capsule Take 1 capsule by mouth Daily. 30 capsule 0    rivaroxaban (Xarelto) 20 MG tablet Take 1 tablet by mouth Daily With Dinner. 90 tablet 1    traZODone (DESYREL) 50 MG tablet Take 1 tablet by mouth At Night As Needed for Sleep. 30 tablet 5    albuterol sulfate  (90 Base) MCG/ACT inhaler Inhale 2 puffs Every 4 (Four) Hours As Needed for Wheezing. 18 g 2    azithromycin (Zithromax Z-Carlito) 250 MG tablet Take 2 tablets by mouth on day 1, then 1 tablet daily on days 2-5 6 tablet 0    citalopram (CeleXA) 40 MG tablet Take 1 tablet by mouth Daily. 90 tablet 1     No current facility-administered medications for this visit.     Past Surgical History:   Procedure Laterality Date    ENDOSCOPY  1999    EXERCISE STRESS - CONVERTED  2014    treadmill stress test; NEG    LAMINECTOMY      lumbar region      Health Maintenance Due   Topic Date Due    Pneumococcal Vaccine 0-64 (1  of 2 - PCV) Never done    TDAP/TD VACCINES (1 - Tdap) Never done    ZOSTER VACCINE (1 of 2) Never done    PAP SMEAR  Never done    BMI FOLLOWUP  04/12/2023    ANNUAL PHYSICAL  02/22/2024    LIPID PANEL  03/16/2024    COVID-19 Vaccine (3 - 2024-25 season) 09/01/2024      Immunization History   Administered Date(s) Administered    COVID-19 (PFIZER) Purple Cap Monovalent 07/26/2021, 08/16/2021         Part of this note may be an electronic transcription/translation of spoken language to printed   text using the Dragon Dictation System.      Clarissa Vasques, APRN

## 2024-11-21 NOTE — TELEPHONE ENCOUNTER
rivaroxaban (Xarelto) 20 MG tablet        Sig: Take 1 tablet by mouth Daily With Dinner.        Sent to pharmacy as: Rivaroxaban 20 MG Oral Tablet (Xarelto)        Class: Normal        Route: Oral        E-Prescribing Status: Receipt confirmed by pharmacy (11/21/2024 10:52 AM EST)

## 2024-11-21 NOTE — TELEPHONE ENCOUNTER
Caller: Daily Cartagena    Relationship: Self    Best call back number: [phone]    Requested Prescriptions:   Requested Prescriptions     Pending Prescriptions Disp Refills    rivaroxaban (Xarelto) 20 MG tablet 90 tablet 1     Sig: Take 1 tablet by mouth Daily With Dinner.    amLODIPine (NORVASC) 10 MG tablet     albuterol sulfate  (90 Base) MCG/ACT inhaler     citalopram (CeleXA) 40 MG tablet       Pharmacy where request should be sent: [place] [address] [facility] [phone] [facility] [phone] [facility]     Last office visit with prescribing clinician: 11/21/2024   Last telemedicine visit with prescribing clinician: Visit date not found   Next office visit with prescribing clinician: 12/11/2024     Additional details provided by patient: PATIENT IS OUT OF HER MEDICATIONS. SHE NEEDS NEW PRESCRIPTIONS WITH REFILLS SENT TO PHARMACY ASAP TODAY.    Does the patient have less than a 3 day supply:  [x] Yes  [] No    Would you like a call back once the refill request has been completed: [] Yes [] No    If the office needs to give you a call back, can they leave a voicemail: [] Yes [] No    Celso Young Rep   11/21/24 11:17 EST

## 2024-11-22 NOTE — TELEPHONE ENCOUNTER
Called patient to verify if she is still currently taking this medication. Patient did not answer left vm for patient to return call in regards to verification.

## 2024-11-27 RX ORDER — AMLODIPINE BESYLATE 10 MG/1
10 TABLET ORAL DAILY
Qty: 90 TABLET | Refills: 0 | Status: SHIPPED | OUTPATIENT
Start: 2024-11-27

## 2024-12-11 ENCOUNTER — LAB (OUTPATIENT)
Dept: LAB | Facility: HOSPITAL | Age: 64
End: 2024-12-11
Payer: COMMERCIAL

## 2024-12-11 ENCOUNTER — OFFICE VISIT (OUTPATIENT)
Dept: FAMILY MEDICINE CLINIC | Age: 64
End: 2024-12-11
Payer: COMMERCIAL

## 2024-12-11 VITALS
BODY MASS INDEX: 35.08 KG/M2 | TEMPERATURE: 98.1 F | OXYGEN SATURATION: 95 % | HEART RATE: 77 BPM | HEIGHT: 63 IN | SYSTOLIC BLOOD PRESSURE: 119 MMHG | DIASTOLIC BLOOD PRESSURE: 79 MMHG | WEIGHT: 198 LBS

## 2024-12-11 DIAGNOSIS — R73.09 ELEVATED GLUCOSE: ICD-10-CM

## 2024-12-11 DIAGNOSIS — F17.219 CIGARETTE NICOTINE DEPENDENCE WITH NICOTINE-INDUCED DISORDER: ICD-10-CM

## 2024-12-11 DIAGNOSIS — Z00.00 ROUTINE GENERAL MEDICAL EXAMINATION AT A HEALTH CARE FACILITY: Primary | ICD-10-CM

## 2024-12-11 DIAGNOSIS — I10 ESSENTIAL (PRIMARY) HYPERTENSION: ICD-10-CM

## 2024-12-11 DIAGNOSIS — Z23 IMMUNIZATION DUE: ICD-10-CM

## 2024-12-11 DIAGNOSIS — J44.1 COPD WITH EXACERBATION: ICD-10-CM

## 2024-12-11 DIAGNOSIS — Z23 ENCOUNTER FOR IMMUNIZATION: ICD-10-CM

## 2024-12-11 DIAGNOSIS — Z13.6 SCREENING FOR CARDIOVASCULAR CONDITION: ICD-10-CM

## 2024-12-11 DIAGNOSIS — R74.8 ELEVATED ALKALINE PHOSPHATASE LEVEL: ICD-10-CM

## 2024-12-11 DIAGNOSIS — F41.9 ANXIETY: ICD-10-CM

## 2024-12-11 DIAGNOSIS — I48.19 PERSISTENT ATRIAL FIBRILLATION: Chronic | ICD-10-CM

## 2024-12-11 DIAGNOSIS — E78.5 HYPERLIPIDEMIA, UNSPECIFIED HYPERLIPIDEMIA TYPE: ICD-10-CM

## 2024-12-11 DIAGNOSIS — G47.00 INSOMNIA, UNSPECIFIED TYPE: ICD-10-CM

## 2024-12-11 LAB
ABO GROUP BLD: NORMAL
ALBUMIN SERPL-MCNC: 4 G/DL (ref 3.5–5.2)
ALBUMIN/GLOB SERPL: 1.7 G/DL
ALP SERPL-CCNC: 152 U/L (ref 39–117)
ALT SERPL W P-5'-P-CCNC: 14 U/L (ref 1–33)
ANION GAP SERPL CALCULATED.3IONS-SCNC: 11 MMOL/L (ref 5–15)
AST SERPL-CCNC: 16 U/L (ref 1–32)
BILIRUB SERPL-MCNC: 0.5 MG/DL (ref 0–1.2)
BUN SERPL-MCNC: 13 MG/DL (ref 8–23)
BUN/CREAT SERPL: 17.3 (ref 7–25)
CALCIUM SPEC-SCNC: 8.9 MG/DL (ref 8.6–10.5)
CHLORIDE SERPL-SCNC: 104 MMOL/L (ref 98–107)
CHOLEST SERPL-MCNC: 190 MG/DL (ref 0–200)
CO2 SERPL-SCNC: 23 MMOL/L (ref 22–29)
CREAT SERPL-MCNC: 0.75 MG/DL (ref 0.57–1)
DEPRECATED RDW RBC AUTO: 47.4 FL (ref 37–54)
EGFRCR SERPLBLD CKD-EPI 2021: 89 ML/MIN/1.73
ERYTHROCYTE [DISTWIDTH] IN BLOOD BY AUTOMATED COUNT: 13.6 % (ref 12.3–15.4)
GLOBULIN UR ELPH-MCNC: 2.4 GM/DL
GLUCOSE SERPL-MCNC: 132 MG/DL (ref 65–99)
HBA1C MFR BLD: 5.6 % (ref 4.8–5.6)
HCT VFR BLD AUTO: 47.3 % (ref 34–46.6)
HDLC SERPL-MCNC: 46 MG/DL (ref 40–60)
HGB BLD-MCNC: 14.8 G/DL (ref 12–15.9)
LDLC SERPL CALC-MCNC: 125 MG/DL (ref 0–100)
LDLC/HDLC SERPL: 2.68 {RATIO}
MCH RBC QN AUTO: 29 PG (ref 26.6–33)
MCHC RBC AUTO-ENTMCNC: 31.3 G/DL (ref 31.5–35.7)
MCV RBC AUTO: 92.6 FL (ref 79–97)
PLATELET # BLD AUTO: 223 10*3/MM3 (ref 140–450)
PMV BLD AUTO: 10.4 FL (ref 6–12)
POTASSIUM SERPL-SCNC: 3.9 MMOL/L (ref 3.5–5.2)
PROT SERPL-MCNC: 6.4 G/DL (ref 6–8.5)
RBC # BLD AUTO: 5.11 10*6/MM3 (ref 3.77–5.28)
RH BLD: NEGATIVE
SODIUM SERPL-SCNC: 138 MMOL/L (ref 136–145)
TRIGL SERPL-MCNC: 103 MG/DL (ref 0–150)
VLDLC SERPL-MCNC: 19 MG/DL (ref 5–40)
WBC NRBC COR # BLD AUTO: 8.9 10*3/MM3 (ref 3.4–10.8)

## 2024-12-11 PROCEDURE — 80053 COMPREHEN METABOLIC PANEL: CPT

## 2024-12-11 PROCEDURE — 3074F SYST BP LT 130 MM HG: CPT | Performed by: NURSE PRACTITIONER

## 2024-12-11 PROCEDURE — 91320 SARSCV2 VAC 30MCG TRS-SUC IM: CPT | Performed by: NURSE PRACTITIONER

## 2024-12-11 PROCEDURE — 90677 PCV20 VACCINE IM: CPT | Performed by: NURSE PRACTITIONER

## 2024-12-11 PROCEDURE — 90471 IMMUNIZATION ADMIN: CPT | Performed by: NURSE PRACTITIONER

## 2024-12-11 PROCEDURE — 90480 ADMN SARSCOV2 VAC 1/ONLY CMP: CPT | Performed by: NURSE PRACTITIONER

## 2024-12-11 PROCEDURE — 85027 COMPLETE CBC AUTOMATED: CPT

## 2024-12-11 PROCEDURE — 86900 BLOOD TYPING SEROLOGIC ABO: CPT

## 2024-12-11 PROCEDURE — 83036 HEMOGLOBIN GLYCOSYLATED A1C: CPT

## 2024-12-11 PROCEDURE — 80061 LIPID PANEL: CPT

## 2024-12-11 PROCEDURE — 1160F RVW MEDS BY RX/DR IN RCRD: CPT | Performed by: NURSE PRACTITIONER

## 2024-12-11 PROCEDURE — 1159F MED LIST DOCD IN RCRD: CPT | Performed by: NURSE PRACTITIONER

## 2024-12-11 PROCEDURE — 3078F DIAST BP <80 MM HG: CPT | Performed by: NURSE PRACTITIONER

## 2024-12-11 PROCEDURE — 36415 COLL VENOUS BLD VENIPUNCTURE: CPT

## 2024-12-11 PROCEDURE — 86901 BLOOD TYPING SEROLOGIC RH(D): CPT

## 2024-12-11 PROCEDURE — 99396 PREV VISIT EST AGE 40-64: CPT | Performed by: NURSE PRACTITIONER

## 2024-12-11 RX ORDER — TRAZODONE HYDROCHLORIDE 100 MG/1
100 TABLET ORAL NIGHTLY PRN
Qty: 30 TABLET | Refills: 5 | Status: SHIPPED | OUTPATIENT
Start: 2024-12-11

## 2024-12-11 RX ORDER — METHYLPREDNISOLONE 4 MG/1
TABLET ORAL
Qty: 21 TABLET | Refills: 0 | Status: SHIPPED | OUTPATIENT
Start: 2024-12-11

## 2024-12-11 NOTE — PROGRESS NOTES
Chief Complaint  Daily Cartagena presents to [facility] for Annual Exam    Subjective     History of Present Illness  Daily is here today for annual exam.   Last annual exam was 1 year  Last eye exam:   PROVIDER: [facility]  Last dental exam:   n/a    PROVIDER:  NONE - dentures   Last menstrual period:  years ago. menopausal  Diet / exercise:   No special diet or specific exercise program  Patient's Body mass index is 35.07 kg/m². indicating that she is obese (BMI >30).  Satisfied with weight?  no    Patient Care Team:  Clarissa Vasques APRN as PCP - General (Nurse Practitioner)  Jason De La Cruz MD as Consulting Physician (Cardiology)      Daily has been coughing and congested for a few weeks now.  She is a current smoker but is working on it.  She denies any fever but feels bad.  She was recently given a zpack but this did not help- she continues to wheeze and feel short of breath at times.    She continues to struggle with sleep  taking 50 mg trazodone and not helping   difficulty with falling and staying asleep.     Daily presents today for follow up on hypertension.    Current medication treatment includes  losartan 50 BID mg and amlodipine 10 daily mg, and is compliant with medications.   Side effects reported :  No  Home blood pressure monitoring readings reported as home BP checks: not checked  Medication changes are not recommended at this time      Labs showing alk phos persistently elevated over last 2 years   gradually going up    will refer for further evaluation / recommendation      Assessment and Plan     Diagnoses and all orders for this visit:    1. Routine general medical examination at a health care facility (Primary)    2. Elevated alkaline phosphatase level  Comments:  referral for evaluation  Orders:  -     Ambulatory Referral to Gastroenterology    3. Insomnia, unspecified type  Comments:  increase trazodone to 100 mg nightly  Orders:  -      traZODone (DESYREL) 100 MG tablet; Take 1 tablet by mouth At Night As Needed for Sleep.  Dispense: 30 tablet; Refill: 5    4. COPD with exacerbation  Comments:  will treat with steroid pack   just recently completed zpack  advised on mucinex daily to help with congestions and use inhaler  Orders:  -     methylPREDNISolone (Medrol) 4 MG dose pack; Take as directed on package instructions.  Dispense: 21 tablet; Refill: 0    5. Essential (primary) hypertension  Comments:  well controlled today  f/u 6 months  Orders:  -     losartan (COZAAR) 50 MG tablet; Take 1 tablet by mouth 2 (Two) Times a Day.  Dispense: 180 tablet; Refill: 1  -     amLODIPine (NORVASC) 10 MG tablet; Take 1 tablet by mouth Daily.  Dispense: 90 tablet; Refill: 1    6. Hyperlipidemia, unspecified hyperlipidemia type  Comments:  medication as prescribed  labs and follow up as recommended in 6 motns  Orders:  -     atorvastatin (LIPITOR) 40 MG tablet; Take 1 tablet by mouth every night at bedtime.  Dispense: 90 tablet; Refill: 1    7. Persistent atrial fibrillation  Comments:  continue current treatment   will need to follow up with cardiology as recommended  Orders:  -     metoprolol tartrate (LOPRESSOR) 100 MG tablet; Take 1 tablet by mouth 2 (Two) Times a Day. No further refills from our office , will need to follow up with cardiology for management  Dispense: 180 tablet; Refill: 1  -     rivaroxaban (Xarelto) 20 MG tablet; Take 1 tablet by mouth Daily With Dinner.  Dispense: 90 tablet; Refill: 1    8. Anxiety  Comments:  continue citalopram as prescribed - follow up 6 months  Orders:  -     citalopram (CeleXA) 40 MG tablet; Take 1 tablet by mouth Daily.  Dispense: 90 tablet; Refill: 1    9. Cigarette nicotine dependence with nicotine-induced disorder  Comments:  smoking cessation advised    10. Encounter for immunization  -     Pneumococcal Conjugate Vaccine 20-Valent (PCV20)    11. Immunization due  -     COVID-19 (Pfizer) 12yrs+  (COMIRNATY)            Follow Up   Return in about 6 months (around 6/11/2025) for Recheck.  Future Appointments   Date Time Provider Department Center   2/24/2025  3:15 PM Jason De La Cruz MD [facility] [place] [facility]   6/16/2025  9:00 AM Clarissa Vasques APRN [facility] [place] [facility]       New Medications Ordered This Visit   Medications    traZODone (DESYREL) 100 MG tablet     Sig: Take 1 tablet by mouth At Night As Needed for Sleep.     Dispense:  30 tablet     Refill:  5    methylPREDNISolone (Medrol) 4 MG dose pack     Sig: Take as directed on package instructions.     Dispense:  21 tablet     Refill:  0    atorvastatin (LIPITOR) 40 MG tablet     Sig: Take 1 tablet by mouth every night at bedtime.     Dispense:  90 tablet     Refill:  1    losartan (COZAAR) 50 MG tablet     Sig: Take 1 tablet by mouth 2 (Two) Times a Day.     Dispense:  180 tablet     Refill:  1    amLODIPine (NORVASC) 10 MG tablet     Sig: Take 1 tablet by mouth Daily.     Dispense:  90 tablet     Refill:  1     If not due, please place refills on file    metoprolol tartrate (LOPRESSOR) 100 MG tablet     Sig: Take 1 tablet by mouth 2 (Two) Times a Day. No further refills from our office , will need to follow up with cardiology for management     Dispense:  180 tablet     Refill:  1     If not due, please place refills on file    rivaroxaban (Xarelto) 20 MG tablet     Sig: Take 1 tablet by mouth Daily With Dinner.     Dispense:  90 tablet     Refill:  1     If not due, please place refills on file    citalopram (CeleXA) 40 MG tablet     Sig: Take 1 tablet by mouth Daily.     Dispense:  90 tablet     Refill:  1     If not due, please place refills on file       Medications Discontinued During This Encounter   Medication Reason    azithromycin (Zithromax Z-Carlito) 250 MG tablet *Therapy completed    traZODone (DESYREL) 50 MG tablet Reorder    diclofenac (VOLTAREN) 75 MG EC tablet *Therapy completed    metoprolol tartrate (LOPRESSOR) 100 MG tablet Reorder     "losartan (COZAAR) 50 MG tablet Reorder    atorvastatin (LIPITOR) 40 MG tablet Reorder    rivaroxaban (Xarelto) 20 MG tablet Reorder    amLODIPine (NORVASC) 10 MG tablet Reorder    citalopram (CeleXA) 40 MG tablet Reorder          Review of Systems    Objective     Vitals:    12/11/24 1412   BP: 119/79   BP Location: Right arm   Patient Position: Sitting   Cuff Size: Large Adult   Pulse: 77   Temp: 98.1 °F (36.7 °C)   TempSrc: Oral   SpO2: 95%   Weight: 89.8 kg (198 lb)   Height: 160 cm (63\")     Class 2 Severe Obesity (BMI >=35 and <=39.9). Obesity-related health conditions include the following: hypertension, dyslipidemias, and GERD. Obesity is unchanged. BMI is is above average; BMI management plan is completed. We discussed portion control and increasing exercise.      Physical Exam  Vitals reviewed.   Constitutional:       Appearance: Normal appearance. She is well-developed. She is not ill-appearing.   HENT:      Head: Normocephalic and atraumatic.      Right Ear: Tympanic membrane, ear canal and external ear normal.      Left Ear: Tympanic membrane, ear canal and external ear normal.      Mouth/Throat:      Lips: Pink.      Mouth: Mucous membranes are moist.      Pharynx: Oropharynx is clear. Uvula midline. No oropharyngeal exudate.   Eyes:      Extraocular Movements: Extraocular movements intact.      Conjunctiva/sclera: Conjunctivae normal.      Pupils: Pupils are equal, round, and reactive to light.   Neck:      Thyroid: No thyromegaly.   Cardiovascular:      Rate and Rhythm: Normal rate and regular rhythm.      Heart sounds: No murmur heard.     No friction rub. No gallop.   Pulmonary:      Effort: Pulmonary effort is normal.      Breath sounds: Examination of the right-upper field reveals wheezing. Examination of the left-upper field reveals wheezing. Wheezing present. No rhonchi.   Abdominal:      General: Bowel sounds are normal. There is no distension.      Palpations: Abdomen is soft.      Tenderness: " There is no abdominal tenderness.   Musculoskeletal:      Cervical back: Normal range of motion.   Lymphadenopathy:      Head:      Right side of head: No submandibular adenopathy.      Left side of head: No submandibular adenopathy.      Cervical: No cervical adenopathy.   Skin:     General: Skin is warm and dry.      Findings: No lesion or rash.   Neurological:      Mental Status: She is alert and oriented to person, place, and time.      Cranial Nerves: No cranial nerve deficit.      Gait: Gait is intact.   Psychiatric:         Mood and Affect: Mood and affect normal.         Behavior: Behavior normal.         Thought Content: Thought content normal.         Judgment: Judgment normal.               Result Review   The following data was reviewed by: GALINA Lockwood on 12/11/2024:  CBC No Differential (12/11/2024 13:21)  Hemoglobin A1c (12/11/2024 13:21)  ABO/Rh (12/11/2024 13:21)  Comprehensive metabolic panel (12/11/2024 13:21)  Lipid panel (12/11/2024 13:21)   No Known Allergies   Past Medical History:   Diagnosis Date    Acute upper respiratory infection, unspecified     Anxiety disorder, unspecified     Atrial fibrillation     Dorsalgia, unspecified     Essential (primary) hypertension     Heartburn     HLD (hyperlipidemia)     unspecified    Insomnia, unspecified     Localized edema     Nicotine dependence, unspecified, uncomplicated      Current Outpatient Medications   Medication Sig Dispense Refill    albuterol sulfate  (90 Base) MCG/ACT inhaler Inhale 2 puffs Every 4 (Four) Hours As Needed for Wheezing. 18 g 2    amLODIPine (NORVASC) 10 MG tablet Take 1 tablet by mouth Daily. 90 tablet 1    atorvastatin (LIPITOR) 40 MG tablet Take 1 tablet by mouth every night at bedtime. 90 tablet 1    citalopram (CeleXA) 40 MG tablet Take 1 tablet by mouth Daily. 90 tablet 1    losartan (COZAAR) 50 MG tablet Take 1 tablet by mouth 2 (Two) Times a Day. 180 tablet 1    metoprolol tartrate (LOPRESSOR) 100 MG  tablet Take 1 tablet by mouth 2 (Two) Times a Day. No further refills from our office , will need to follow up with cardiology for management 180 tablet 1    omeprazole (priLOSEC) 20 MG capsule Take 1 capsule by mouth Daily. 30 capsule 0    rivaroxaban (Xarelto) 20 MG tablet Take 1 tablet by mouth Daily With Dinner. 90 tablet 1    traZODone (DESYREL) 100 MG tablet Take 1 tablet by mouth At Night As Needed for Sleep. 30 tablet 5    methylPREDNISolone (Medrol) 4 MG dose pack Take as directed on package instructions. 21 tablet 0     No current facility-administered medications for this visit.     Past Surgical History:   Procedure Laterality Date    ENDOSCOPY  1999    EXERCISE STRESS - CONVERTED  2014    treadmill stress test; NEG    LAMINECTOMY      lumbar region      Health Maintenance Due   Topic Date Due    TDAP/TD VACCINES (1 - Tdap) Never done    ZOSTER VACCINE (1 of 2) Never done    PAP SMEAR  Never done    BMI FOLLOWUP  04/12/2023      Immunization History   Administered Date(s) Administered    COVID-19 (PFIZER) 12YRS+ (COMIRNATY) 12/11/2024    COVID-19 (PFIZER) Purple Cap Monovalent 07/26/2021, 08/16/2021    Pneumococcal Conjugate 20-Valent (PCV20) 12/11/2024         Part of this note may be an electronic transcription/translation of spoken language to printed   text using the Dragon Dictation System.      Clarissa Vasques APRN   Yes

## 2024-12-18 RX ORDER — CITALOPRAM HYDROBROMIDE 40 MG/1
40 TABLET ORAL DAILY
Qty: 90 TABLET | Refills: 1 | Status: SHIPPED | OUTPATIENT
Start: 2024-12-18

## 2024-12-18 RX ORDER — ATORVASTATIN CALCIUM 40 MG/1
40 TABLET, FILM COATED ORAL
Qty: 90 TABLET | Refills: 1 | Status: SHIPPED | OUTPATIENT
Start: 2024-12-18

## 2024-12-18 RX ORDER — AMLODIPINE BESYLATE 10 MG/1
10 TABLET ORAL DAILY
Qty: 90 TABLET | Refills: 1 | Status: SHIPPED | OUTPATIENT
Start: 2024-12-18

## 2024-12-18 RX ORDER — LOSARTAN POTASSIUM 50 MG/1
50 TABLET ORAL 2 TIMES DAILY
Qty: 180 TABLET | Refills: 1 | Status: SHIPPED | OUTPATIENT
Start: 2024-12-18

## 2024-12-18 RX ORDER — METOPROLOL TARTRATE 100 MG/1
100 TABLET ORAL 2 TIMES DAILY
Qty: 180 TABLET | Refills: 1 | Status: SHIPPED | OUTPATIENT
Start: 2024-12-18

## 2024-12-23 ENCOUNTER — TELEPHONE (OUTPATIENT)
Dept: FAMILY MEDICINE CLINIC | Age: 64
End: 2024-12-23
Payer: COMMERCIAL

## 2024-12-23 NOTE — TELEPHONE ENCOUNTER
Caller: Daily Cartagena    Relationship: Self    Best call back number: [phone]     What medication are you requesting: ANTIBIOTIC    What are your current symptoms: COUGHING, SNEEZING, EYES WATERING      If a prescription is needed, what is your preferred pharmacy and phone number: [facility] [address] [place] [address] [address] [facility] [phone] [facility] [phone]      Additional notes: SYMPTOMS ARE NOT GETTING BETTER EVEN AFTER STEROIDS- PROVIDER AWARE.        CONTACT PATIENT TO ADVISE.

## 2024-12-23 NOTE — TELEPHONE ENCOUNTER
Pt advised Clarissa is out of office and will need to be seen for evaluation, pt declined to schedule appointment

## 2025-04-15 NOTE — TELEPHONE ENCOUNTER
----- Message from GALINA Johnson sent at 7/21/2022 10:45 AM EDT -----  Stress test was normal.  No further testing needs to be done.   Rx request